# Patient Record
Sex: FEMALE | Race: WHITE | Employment: FULL TIME | URBAN - METROPOLITAN AREA
[De-identification: names, ages, dates, MRNs, and addresses within clinical notes are randomized per-mention and may not be internally consistent; named-entity substitution may affect disease eponyms.]

---

## 2017-01-20 NOTE — PROGRESS NOTES
Patient ID:   Jayy Gutiérrez  is a 27year old female         HPI:     Here for annual gyn exam. Last seen 2015. New medical/surgical hx:  None for her. Mother with partial thyroidectomy for nodule. Pathology pending.  Had TFT's with salma to Visit:  Levonorgestrel-Ethinyl Estrad 0.1-20 MG-MCG Oral Tab Take 1 tablet by mouth daily. Disp: 3 Package Rfl: 0     No current facility-administered medications on file prior to visit.   PHYSICAL EXAM:    Physical Exam   /76 mmHg  Pulse 75  Ht 64

## 2017-01-25 NOTE — PROGRESS NOTES
Quick Note:    ASCUS pap smear but HPV screening negative = negative pap smear.  Repeat pap smear in on year.  ______

## 2017-01-31 NOTE — TELEPHONE ENCOUNTER
Left detailed message on cell voicemail. Pt has FYI. Ascus pap/hpv neg- f/u 1 year rec. Pt to call if any questions or concerns.

## 2018-01-10 NOTE — PATIENT INSTRUCTIONS
Middle Ear Infection (Adult)  You have an infection of the middle ear, the space behind the eardrum. This is also called acute otitis media (AOM). Sometimes it is caused by the common cold.  This is because congestion can block the internal passage (eusta The sinuses are air-filled spaces within the bones of the face. They connect to the inside of the nose. Sinusitis is an inflammation of the tissue lining the sinus cavity. Sinus inflammation can occur during a cold.  It can also be due to allergies to polle · Do not use nasal rinses or irrigation during an acute sinus infection, unless told to by your health care provider. Rinsing may spread the infection to other sinuses.   · Use acetaminophen or ibuprofen to control pain, unless another pain medicine was pre

## 2018-01-10 NOTE — PROGRESS NOTES
HPI:   Darcie Collins is a 32year old female who presents for upper respiratory symptoms for several weeks.  Patient reports sore throat only at the beginning of sx's, congestion, dry cough, ear pain, sinus pain, OTC cold meds have not been helping, den 64.5\"   Wt 146 lb   LMP 12/24/2017 (Approximate)   BMI 24.67 kg/m²   GENERAL: well developed, well nourished,in no apparent distress  SKIN: no rashes,no suspicious lesions  EYES:PERRL, EOMI,conjunctiva are clear  HEENT: atraumatic, normocephalic,no pharyn

## 2018-01-11 NOTE — TELEPHONE ENCOUNTER
Pt's Mom informed of instructions per daughter's request to speak with Mom and voiced understanding.

## 2018-01-11 NOTE — TELEPHONE ENCOUNTER
I would continue with Augmentin at this time. Our choices are limited as far as antibiotic use for sinus infection especially given that she has an allergy to Levaquin.   Even though her headache started to get worse after starting Augmentin, her worsening

## 2018-01-11 NOTE — TELEPHONE ENCOUNTER
Per pt she was seen yesterday and was diagnosed with ear infection and sinus infection. Pt called and complained of severe headache on her forehead and radiates all over . States she started the abx last night and now with the severe headache.   Wants to

## 2018-01-16 NOTE — TELEPHONE ENCOUNTER
Patient last seen 1/20/17; due for annual. Please contact her to schedule appt and then return to RN pool for refill.  Thank you

## 2018-01-19 NOTE — TELEPHONE ENCOUNTER
Patient was seen by Dr. Miladis Toro on 1/10/18 for sinus/ear infection. Patient  States her sinuses are much better but her ear still has pain and pressure. States she has only 2 doses of Amoxicillin-Pot Clavulanate 875-125 MG Oral Tab left.   With the weekend c

## 2018-01-19 NOTE — TELEPHONE ENCOUNTER
Will send in trial of medrol dose sandi but if no improvement, go to immediate care over the weekend. Otherwise, I can see her Monday or Tuesday if no better and not severe.

## 2018-01-19 NOTE — TELEPHONE ENCOUNTER
Raya De Leon spoke with dr soni who provided the following info for us to advise pt-  He will send order for a medrol dose pack to pharmacy today.  If new or worsening sx over wkend, proceed to [facility] immediate care for evaluation/further recommendatio

## 2018-01-19 NOTE — TELEPHONE ENCOUNTER
Call to pt's cell reaches identified # voice mail. Per hipaa consent, left detailed vmm with dr soni's comments/instructions and rationale for medrol dosepak. Informed rx sent to [facility].   Instructed to have pharmacist show her and explain medrol do

## 2018-01-26 NOTE — TELEPHONE ENCOUNTER
Pt transferred to nurse. She reports finishing steroid yesterday for sinus inf/ear inf. Had been on Augmentin x 10 days prior, finished 1/20. Reports sinus feels better, states she feels great but is still having some lingering pressure in her rt ear.  Wh

## 2018-03-03 NOTE — PROGRESS NOTES
[facility]  Obstetrics and Gynecology  History & Physical    CC: Patient is here for annual well woman exam     Subjective:     HPI: Stella Rowland is a 32year old [place] female here for annual well women exam.  Patient reports no complaints 08/04/2009      OPV                   01/11/1988 06/05/1989 08/14/1990 05/23/1994      Rubella               08/10/1989      TD                    08/04/2003      PSH:  Past Surgical History:  10-30-14: COLPOSCOPY, CERVIX W/UPP mass, no skin or nipple changes and no axillary adenopathy  Abdominal exam:  soft, nontender, nondistended, +BS  Pelvic exam:   VULVA: normal appearing vulva with no masses, tenderness or lesions  VAGINA: normal appearing vagina with normal color and disch

## 2018-03-06 NOTE — PROGRESS NOTES
Patient notified. All results and colpo explained to patient. Patient verbalized understanding. Transferred to reception to schedule appt.

## 2018-03-06 NOTE — PROGRESS NOTES
Please inform the patient of abnormal pap smear noted for LSIL. Patient will need colposcopy. Please provide patient with information of pap smear abnormality and procedure details. Please schedule patient for colposcopy.

## 2018-04-09 PROBLEM — R87.612 PAPANICOLAOU SMEAR OF CERVIX WITH LOW GRADE SQUAMOUS INTRAEPITHELIAL LESION (LGSIL): Status: ACTIVE | Noted: 2018-04-09

## 2018-04-09 NOTE — PATIENT INSTRUCTIONS
[facility] Department of OB/GYN  After Care Instructions for Colposcopy/Biopsy      Biopsy Results   You will receive a phone call with your biopsy results in 7 business days. If you have not received your biopsy results in 7 days, please contact our office.   Bishop Zhou

## 2018-04-09 NOTE — PROCEDURES
Colposcopy Procedure Note    Date of Procedure: 04/09/18    Indications: 32year old y/o female with LGSIL who presents for colposcopy. Hx of LGSIL in 2014 with normal colposcopy but no cervical biopsy and normal subsequent repeat pap smear.      Pre-proced

## 2018-04-18 NOTE — PROGRESS NOTES
Patient messaged on 04/18/18 and results discussed via [employer] per patient request   F/u in 1 year for co-testing.

## 2018-05-30 NOTE — TELEPHONE ENCOUNTER
32year old patient complaining of vaginal itching, irritation, increased white, milky/cloudy discharge that started 5-6 days ago. Denies any odor to the discharge. No clumpy white discharge.   Last OV date: 4/9/18 with Dr. Marti Phillips for colposcopy  Recent

## 2018-05-30 NOTE — TELEPHONE ENCOUNTER
Contacted patient. Let her know that Dr. El Cross would like her to try medication to treat yeast at this time. Instructed on Diflucan and advised to call back with any new, worsening, or symptoms that persist after completing treatment.  Patient states under

## 2019-02-07 NOTE — TELEPHONE ENCOUNTER
Pt coming in early for annual and is to check with insurance.     Future Appointments   Date Time Provider Lukasz Hernadez   2/12/2019  3:00 PM Emma Helton MD [facility]

## 2019-02-12 NOTE — PATIENT INSTRUCTIONS
Please return in 2 months for your annual gyne visit or sooner if having abnormal vaginal bleeding or severe pelvic pain

## 2019-03-06 NOTE — TELEPHONE ENCOUNTER
Last OV: 02/12/19  Last refill date: 02/07/19  Follow-up: RTC 2 months for pap smear and annual  Next appt.: 05/18/19    Refill sent to pharmacy.

## 2019-05-18 PROBLEM — N87.0 CIN I (CERVICAL INTRAEPITHELIAL NEOPLASIA I): Status: ACTIVE | Noted: 2019-05-18

## 2019-05-18 NOTE — PROGRESS NOTES
[facility]  Obstetrics and Gynecology  History & Physical    CC: Patient is here for annual well woman exam     Subjective:     HPI: Giovanna Ellis is a 25year old [place] [facility] female here for annual well women exam.  Patient reports doing well.  Conchita Reeder 01/23/1990 08/20/1999      Measles               08/10/1989      Meningococcal-Menactra                          08/04/2009      OPV                   01/11/1988 06/05/1989 08/14/1990 05/23/1994      Rubella               08/1  Service: Not Asked        Blood Transfusions: Not Asked        Caffeine Concern: Yes          1-2 cups dalily        Occupational Exposure: Not Asked        Hobby Hazards: Not Asked        Sleep Concern: Not Asked        Stress Concern: Not Asked size, shape, consistency and nontender  ADNEXA: normal adnexa in size, nontender and no masses  Ext: non-tender, no edema    Assessment:     Pravin Carias is a 25year old  female here for annual well women exam.     Patient Active Problem List:

## 2019-05-24 NOTE — PROGRESS NOTES
Please inform of ASCUS but negative HPV  Therefore recommend to repeat pap smear with HPV testing in 3 years   Continue annual well woman exams

## 2019-06-29 NOTE — TELEPHONE ENCOUNTER
Patient states that she got a bill from pathology for her pap. Patient called insurance and they said it was billed as non preventative. Patient states she was here for her annual and pap on May 18th, 2019. Can we rebill the pap as preventative?

## 2020-07-15 NOTE — PROGRESS NOTES
[facility]  Obstetrics and Gynecology  History & Physical    CC: Patient presents for a well woman exam     Subjective:     HPI: Darcie Collins is a 30year old [place] female here for a well women exam. Patient reports doing well.  She would l 08/14/1990      MMR                   01/23/1990 08/20/1999      Measles               08/10/1989      Meningococcal-Menactra                          08/04/2009      OPV                   01/11/1988 06/05/1989 08/14/1990 file        Emotionally abused: Not on file        Physically abused: Not on file        Forced sexual activity: Not on file    Other Topics      Concerns:         Service: Not Asked        Blood Transfusions: Not Asked        Caffeine Concern: Yes or lesions  URETHRA: normal appearing  PERINEUM: no lesions  VAGINA: normal appearing vagina with normal color and discharge, no lesions  CERVIX: normal appearing cervix without discharge or lesions  UTERUS: uterus is normal size, shape, consistency and no

## 2021-07-02 NOTE — TELEPHONE ENCOUNTER
Last OV: 7/15/20 with Dr. Luis Fernando Montoya for annual  Last refill date: 7/15/20  Follow-up: 1 year  Next appt.: none scheduled; due 7/2021    Patient due for annual. Please contact her to schedule appt and then return to RN pool for refill.  Thank you

## 2021-08-02 NOTE — TELEPHONE ENCOUNTER
Patient is planning on starting family soon and wanted recommendations for COVID vaccine. Advised patient we do recommend our pregnant patients get the vaccine but she should speak with her PCP for further recommendations as well.

## 2021-08-02 NOTE — TELEPHONE ENCOUNTER
Patient called stating she has questions regarding covid vaccine . Regarding trying to conceive and effects of it?     Thank you

## 2021-08-19 NOTE — PROGRESS NOTES
[facility]  Obstetrics and Gynecology  History & Physical    CC: Patient presents for a well woman exam     Subjective:     HPI: Jazmin Carlson is a 26year old [place] female here for a well women exam. Patient reports doing well.  She would l (chronic)        Immunization History:     Immunization History  Administered            Date(s) Administered    DTP                   11/30/1988 02/20/1989 08/14/1990 05/23/1994      HEP B                 12/31/1998 02/08/199 Narrative      Not on file    Social Determinants of Health  Financial Resource Strain:       Difficulty of Paying Living Expenses:   Food Insecurity:       Worried About Running Out of Food in the Last Year:       Ran Out of Food in the Last Year:   Trans no tachypnea   Breast: symmetric, no dominant or suspicious mass, no skin or nipple changes and no axillary adenopathy  Abdominal exam: soft, nontender, nondistended  Pelvic exam:   VULVA: normal appearing vulva with no masses, tenderness or lesions  URETH

## 2021-08-27 NOTE — TELEPHONE ENCOUNTER
Pt last seen 8/19/21 for annual and expressed desire to continue on current OCP; refill was not given at that time. Refilled x one year.

## 2022-06-21 NOTE — TELEPHONE ENCOUNTER
Spoke with patient. Patient was wondering if it is normal to be seen until almost 10 weeks gestation and also why is she not feeling nauseous. At this time, she is only experiencing breast tenderness.  appointment; 7/26/2022   Patient denies any vaginal bleeding, pain, nausea, vomiting. Reassured patient that it is normal to be seen around 10 weeks gestation. Also that it is ok not to feel nauseous yet. ER precautions given. All questions answered. Advised patient to call back with any other questions or concerns. Pt  verbalizes understanding.

## 2022-06-21 NOTE — TELEPHONE ENCOUNTER
Patient has some questions for the nurse.     Future Appointments   Date Time Provider Lukasz Hernadez   7/26/2022  5:15 PM [facility]   7/26/2022  5:45 PM Tej Wilkinson MD [facility]

## 2022-07-26 NOTE — PROGRESS NOTES
[facility]  Obstetrics and Gynecology    Subjective:     Emerald Bay is a 33year old [address] [facility] [place] who presents with c/o secondary amenorrhea and positive home pregnancy test. The patient was recommended to return for further evaluation. The patient reports overall feeling well. Denies abdominal/pelvic pain, VB, abnormal discharge. Patient's last menstrual period was 2022 (exact date). . Pt reports regular menstrual cycles. She was actively trying to conceive. Review of Systems:  General:  denies fevers, chills, fatigue and malaise. Respiratory:  denies SOB, dyspnea, cough or wheezing  Cardiovascular:  denies chest pain, palpitations, exercise intolerance   GI: denies abdominal pain, diarrhea, constipation  :  denies dysuria, hematuria, increased urinary frequency. denies abnormal uterine bleeding or vaginal discharge. Objective:     General: AAOx3, NAD, appears well  Resp: unlabored breathing    Imaging:  Dating US 2022: Viable maravilla IUP with CRL 3.1cm c/w 10w0d. . No free fluid, adnexa appear normal.       Assessment:     Emerald Bay is a 33year old  with viable maravilla IUP at 9w6d by LMP c/w US today, final JEAN CARLOS 23. Patient Active Problem List:     Familial benign hematuria     HAI I (cervical intraepithelial neoplasia I)      Plan:     - positive urine pregnancy test and viable IUP on US  - Prenatal course and care discussed with patient - visits, office locations, providers and on-call physicians  - SAB/return precautions provided   - N/V recommendations if relevant  - Pt provided with education on safety, diet, exercise, caffiene, tobacco, ETOH, sexual activity, ER precautions, diet, exercise, appropriate otc medication use, substance abuse   - Counseled on taking a PNV with folic acid and DHA  - genetic screening testing d/w patient. She was given educational information and will offer screening at next visit.  Advised to call insurance company prior to her next visit. - cervical cancer screening: UTD with neg pap and co-testing in 2021    All of the findings and plan were discussed with the patient. She notes understanding and agrees with the plan of care. All questions were answered to the best of my ability at this time.     RTC for new OB visit or sooner if needed     Fredo Arenas MD  [facility]  7/26/2022 6:08 PM

## 2022-07-28 PROBLEM — R87.612 PAPANICOLAOU SMEAR OF CERVIX WITH LOW GRADE SQUAMOUS INTRAEPITHELIAL LESION (LGSIL): Status: RESOLVED | Noted: 2018-04-09 | Resolved: 2022-07-28

## 2022-08-22 NOTE — TELEPHONE ENCOUNTER
Pt has new ob appt with Dr. Jenkins Favorite on 8/29 and she called her insurance and she has question/ need code for some test that she is going to need prior to her visit.  Please call pt back

## 2022-08-29 PROBLEM — Z34.90 ENCOUNTER FOR SUPERVISION OF NORMAL PREGNANCY, ANTEPARTUM (HCC): Status: ACTIVE | Noted: 2022-08-29

## 2022-08-29 PROBLEM — N89.8 HYMENAL REMNANT: Status: ACTIVE | Noted: 2022-08-29

## 2022-08-29 PROBLEM — N87.0 CIN I (CERVICAL INTRAEPITHELIAL NEOPLASIA I): Status: RESOLVED | Noted: 2019-05-18 | Resolved: 2022-08-29

## 2022-09-12 PROBLEM — O26.899 RH NEGATIVE STATE IN ANTEPARTUM PERIOD (HCC): Status: ACTIVE | Noted: 2022-09-12

## 2022-09-12 PROBLEM — Z67.91 RH NEGATIVE STATE IN ANTEPARTUM PERIOD (HCC): Status: ACTIVE | Noted: 2022-09-12

## 2022-09-12 NOTE — TELEPHONE ENCOUNTER
Received genetic results in [address] [place] [address] on Dr.Bannons gibson for review

## 2022-09-12 NOTE — TELEPHONE ENCOUNTER
PT calling and has questions on her Lab results    Advised they need to be re-done    Pt wants to speak to a nurse also

## 2022-09-12 NOTE — TELEPHONE ENCOUNTER
Reviewed results  INCORRECT JEAN CARLOS was used for QUAD   Therefore, results of QUAD not accurate  I called [facility] and had them update JEAN CARLOS to 2/22/23  They will recalculate and results available in 1-2 days. Also noted pt is RH negative. I called patient, she did not answer. Left a VM telling her to disregard QUAD results  Also a [employer] message was sent regard QUAD and rh negative.     Portia Zhang MD, 09/12/22, 4:32 PM

## 2022-09-20 NOTE — PROGRESS NOTES
Patient presents with:  Prenatal Care: martinez - wants to discuss genetic testing labs that were done. Routine prenatal visit without complaints. Patient denies any bleeding, leaking fluid, cramping, or contractions. Assessment/Plan:  17w6d doing well  Anatomy US 2 weeks  Glycosuria- no risk factors for pre-gestational diabetes. Recheck at next visit and consider early glucola if still positive. Rh negative- Rhogam 28 weeks, Ab screen 24 weeks. Patient had Quad screen, recalculated due to incorrect EDC. Patient reassured regarding test results. Reviewed signs and symptoms of  labor  Diagnoses and all orders for this visit:    Supervision of normal first pregnancy, antepartum  -     URINALYSIS NONAUTO W/O SCOPE (OB URISTIX)       Return for Ultrasound 2 weeks, MARTINEZ Hand

## 2022-10-07 PROBLEM — Z34.00 SUPERVISION OF NORMAL FIRST PREGNANCY, ANTEPARTUM (HCC): Status: ACTIVE | Noted: 2022-08-29

## 2022-10-07 NOTE — PROGRESS NOTES
[facility]    GA: 20w2d   10/07/22  0955   BP: 108/72   Pulse: 75   Weight: 149 lb 8 oz (67.8 kg)   Height: 65\"       Doing well  No complaints. Denies LOF/VB/uctx  RH neg, repeat AB screen ordered   Genetic testing Quad screen    Anatomy Scan 10/07 unremarkable   CBC and 1 hr GTT order and instructions provided    Problem List Items Addressed This Visit        Obstetrical    Supervision of normal first pregnancy, antepartum - Primary    Relevant Orders    US OB COMPLETE 2ND TRIMESTER >14 WKS [facility] ONLY [address] (Completed)    CBC WITH DIFFERENTIAL WITH PLATELET    GLUCOSE 1HR OB    Rh negative state in antepartum period    Relevant Orders    ANTIBODY SCREEN, RBC W/REFL ID, TITER AND AG    ABORH (BLOOD TYPE)       Genitourinary    Hymenal remnant      Other Visit Diagnoses     Prenatal care, antepartum        Relevant Orders    URINALYSIS NONAUTO W/O SCOPE (OB URISTIX) (Completed)            RTC in 4 wks         Note to patient and family   The [facility] makes medical notes available to patients in the interest of transparency. However, please be advised that this is a medical document. It is intended as jpbi-yr-shwa communication. It is written and medical language may contain abbreviations or verbiage that are technical and unfamiliar. It may appear blunt or direct. Medical documents are intended to carry relevant information, facts as evident, and the clinical opinion of the practitioner.

## 2022-10-18 PROBLEM — O98.519 COVID-19 AFFECTING PREGNANCY, ANTEPARTUM (HCC): Status: ACTIVE | Noted: 2022-10-16

## 2022-10-18 PROBLEM — U07.1 COVID-19 AFFECTING PREGNANCY, ANTEPARTUM (HCC): Status: ACTIVE | Noted: 2022-10-16

## 2022-11-02 NOTE — PROGRESS NOTES
Patient presents with:  Prenatal Care    Routine prenatal visit. Patient without complaints. Good fetal movement  Patient denies any bleeding, leaking fluid, cramping, or contractions. Assessment/Plan:  24w0d doing well  1 hour GTT, CBC previously ordered - reminded patient to complete  Depression screen reviewed. Blood type A negative - rhogam at 28 weeks  Reviewed vaccine recommendations: Tdap discussed, patient will consider at next visit  COVID in pregnancy- 32 week growth US  Fetal movement and  labor discussed    Diagnoses and all orders for this visit:    24 weeks gestation of pregnancy  -     URINALYSIS NONAUTO W/O SCOPE (OB URISTIX)    COVID-19 affecting pregnancy, antepartum    Rh negative state in antepartum period      Return in about 4 weeks (around 2022) for [facility].

## 2022-12-01 NOTE — PROGRESS NOTES
Patient presents with:  Prenatal Care: [facility]    Routine prenatal visit. Patient without complaints. Good fetal movement  Patient denies any bleeding, leaking fluid, cramping, or contractions. Assessment/Plan:  28w1d doing well  1 hour GTT, CBC done. HIV [facility] ordered. Hx of Covid- growth US 32 weeks. Blood type A negative. Rhogam today  Reviewed vaccine recommendations: Tdap next visit. Kick count information given. Reviewed  labor signs and symptoms. Diagnoses and all orders for this visit:    28 weeks gestation of pregnancy  -     URINALYSIS NONAUTO W/O SCOPE (OB URISTIX)     screening encounter  -     HIV AG AB COMBO    Rh negative state in antepartum period  -     Rho D immune globulin (RhoGAM) IM injection 300 mcg      Return in about 2 weeks (around 12/15/2022) for Routine Prenatal Visit, HIV next available, growth US 32 weeks. Roe Brambila

## 2022-12-12 NOTE — PROGRESS NOTES
Patient presents with:  Prenatal Care    Routine prenatal visit without complaints. Patient denies any bleeding, leaking fluid, cramping, or regular uterine contractions. Good fetal movement. Assessment/Plan:  29w5d doing well  HIV ordered [facility]  Hx of Covid- growth US 32 wks  Kick counts reminded  Reviewed  labor signs and symptoms. Reviewed vaccine recommendations: Tdap today  Diagnoses and all orders for this visit:    Prenatal care, antepartum  -     URINALYSIS NONAUTO W/O SCOPE (OB URISTIX)    Need for vaccination  -     TETANUS, DIPHTHERIA TOXOIDS AND ACELLULAR PERTUSIS VACCINE (TDAP), >7 YEARS, IM USE       Return in about 2 weeks (around 2022) for Routine Prenatal Visit, HIV next available.

## 2022-12-28 PROBLEM — O99.713 PRURITUS OF PREGNANCY IN THIRD TRIMESTER (HCC): Status: ACTIVE | Noted: 2022-12-28

## 2022-12-28 PROBLEM — L29.9 PRURITUS OF PREGNANCY IN THIRD TRIMESTER (HCC): Status: ACTIVE | Noted: 2022-12-28

## 2022-12-28 NOTE — PROGRESS NOTES
Patient presents with:  Prenatal Care: [facility] after U/S    Routine prenatal visit complaining of itching upper abdomen with faint rash  Patient denies any bleeding, leaking fluid, cramping, or regular uterine contractions. Good fetal movement. PE: papular/reticular rash upper abdomen  Ultrasound- 38% EFW. Nl amniotic fluid volume. vertex  Assessment/Plan:  32w0d doing well  Pruritis of pregnancy- likely PUPPS, but will check bile acids. Still needs HIV [facility].  Kick counts reminded  Reviewed  labor signs and symptoms. Reviewed vaccine recommendations: Tdap done. Diagnoses and all orders for this visit:    32 weeks gestation of pregnancy  -     URINALYSIS NONAUTO W/O SCOPE (OB URISTIX)    COVID-19 affecting pregnancy, antepartum  -     US OB FOLLOW UP SPECIFIC CONDITION PER FETUS [facility] ONLY; Future    Pruritus of pregnancy in third trimester  -     BILE ACIDS, FRACTIONATED AND TOTAL, PREGNANCY       Return in about 2 weeks (around 2023) for Routine Prenatal Visit, Labs Next Available.

## 2022-12-28 NOTE — PATIENT INSTRUCTIONS
LABOR & DELIVERY PRE-REGISTRATION    Thank you for choosing [facility] for you labor and delivery needs. We look forward to caring for you and your family in this exciting time. In order to better care for all of our patients, [facility] recommends that you complete your delivery registration as early in your pregnancy as possible. Registering for your delivery in advance saves you the time of doing so on your day of delivery and allows your care team to be better prepared for your delivery date. For more information about Labor and Delivery at [facility] and to pre-register, visit [address]--> Search Our Services-->Pregnancy & Baby. TWO WAYS TO REGISTER ONLINE    Epic MyChart allows access to multiple medical organizations, including [employer] and other medical organizations that use the [employer]ainment system. To add [employer] or any other medical organization, just tap My Organizations at the top left corner of the login page in the [address] shanna, and then click Add Organization. [facility] [employer] in [address] [place] [address] for your hospital stay through your CLARED account. After logging into CLARED, click on Visits. Under Visits, click on [employer] for Delivery and follow the directions to register. You may print the confirmation page. If you do not already have a CLARED account, ask our office for an Access Code. Go to [email or web address] and follow the prompts to set up your account. [employer] on [email or web address]- Pre-register for your hospital stay through the convenient online form on our website. Go to [email or web address]. Scroll down the page and click on [address] out all of the required information and click submit. You will receive a confirmation of your submission. Questions about registering for your hospital stay? Contact the [facility] Delivery at [phone].        FETAL MOVEMENT CHART    Although not all women need to perform daily fetal movement counts, if you notice a decrease in fetal activity, you should contact our office immediately. Begin counting fetal movements at 32 weeks of pregnancy. You may find that your baby is more active after eating or drinking. We want you to time how long it takes to feel 10 movements (kicks, flutters, swishes or rolls). You should feel at least 10 movements in 2 hours. You will likely feel 10 movements in less than 2 hours. If you have concerns, you can use the attached table to record movements. Record the time you feel the first movement and the tenth movement. This will help you observe patterns and discover how long it normally takes your baby to move 10 times. Please call us if any of the following occurs: You have not felt the baby move for a couple of hours  It is taking longer each day to get the tenth movement    The main point is we want you to know the characteristics of your baby, so you can tell the doctor or nurse if something different is happening. Again, if you notice a decrease in fetal movement, please give the office a call.     If our office is closed, the answering service will page the doctor on-call.    ------------------------------      Time M T W Th F S S                                                                                                                 Time M T W Th F S S                                                                                                           Time M T W Th F S S                                                                                                           Time M T W Th F S S

## 2023-01-12 NOTE — PROGRESS NOTES
MAITE    GA: 34w1d   23  0904   BP: 112/70   Pulse: 81   Weight: 160 lb 3.2 oz (72.7 kg)       Doing well, +FM  Denies LOF/VB/uctx  Rh negative, TDAP received, EPDS 0  PTL and Fetal movement instructions given      Problem List Items Addressed This Visit        Gravid and     Supervision of normal first pregnancy, antepartum    Rh negative state in antepartum period       Infectious Diseases    COVID-19 affecting pregnancy, antepartum       Skin    Pruritus of pregnancy in third trimester   Other Visit Diagnoses     Prenatal care, antepartum    -  Primary    Relevant Orders    URINALYSIS NONAUTO W/O SCOPE (OB URISTIX) (Completed)          RTC in 2 wks      Note to patient and family   The [facility] makes medical notes available to patients in the interest of transparency. However, please be advised that this is a medical document. It is intended as qkgt-gi-gohm communication. It is written and medical language may contain abbreviations or verbiage that are technical and unfamiliar. It may appear blunt or direct. Medical documents are intended to carry relevant information, facts as evident, and the clinical opinion of the practitioner.

## 2023-01-20 NOTE — TELEPHONE ENCOUNTER
Pt calling states she started with aguilar matamoros contractions on Wednesday but states lingering into today.

## 2023-01-20 NOTE — TELEPHONE ENCOUNTER
35w2d. Experiencing contractions. Feel like mild period cramping. Woke up with them on Wed. And Thursday and Friday throughout the day. Feels like has increase pressure in pelvis. There is no pattern to the cramping. Denies bleeding. Patient to continue to monitor over the weekend. Advised to rest and hydrate to help avoid the contractions. To call back if cramping gets worse or becomes more consistent. Understanding verbalized.

## 2023-01-27 NOTE — PROGRESS NOTES
MAITE    GA: 36w2d   23  1156   BP: 110/62   Weight: 165 lb 3.2 oz (74.9 kg)       Doing well, +FM  Denies LOF/VB/uctx  Mode of delivery:   anticipated  SVE 0/50/-2   GBS collected   Fetal movement count given  Labor precautions provided   Hemorrhoid surgery, OTC remedies and precautions provided. Problem List Items Addressed This Visit        Genitourinary and Reproductive    Hymenal remnant       Gravid and     Supervision of normal first pregnancy, antepartum - Primary    Relevant Orders    STREP B CULTURE    Rh negative state in antepartum period       Infectious Diseases    COVID-19 affecting pregnancy, antepartum       Skin    Pruritus of pregnancy in third trimester   Other Visit Diagnoses     Prenatal care, antepartum        Relevant Orders    URINALYSIS NONAUTO W/O SCOPE (OB URISTIX) (Completed)            RTC 1 week          Note to patient and family   The [facility] makes medical notes available to patients in the interest of transparency. However, please be advised that this is a medical document. It is intended as ipne-qx-rhbf communication. It is written and medical language may contain abbreviations or verbiage that are technical and unfamiliar. It may appear blunt or direct. Medical documents are intended to carry relevant information, facts as evident, and the clinical opinion of the practitioner.

## 2023-02-04 NOTE — PROGRESS NOTES
MAITE 37w3d    Doing well, +FM  Denies contractions  Denies LOF, VB      1. FHT-P  2. PNL:  GBS negative  3. Mode of delivery: anticipate    4. Immunizations: s/p TDAP  5. Labor precautions reviewed  6.  Fetal expectations discussed    Return in 1 weeks ,DirectAddress_Unknown

## 2023-02-10 NOTE — PROGRESS NOTES
MAITE 38w2d    Doing ok, +FM  No true contractions but maybe presure, feels tight  no LOF, VB      1. FHT-present  2. PNL:  GBS neg  3. Mode of delivery:  anticipated    4. Immunizations: s/p TDAP  5. RH neg: reviewed rhogam workup post delivery  6.  Reviewed potential options in regards to bathing the baby, vaccines, colace stool softener, pelvic floor PT    Return in 1 weeks
16

## 2023-02-11 PROBLEM — Z34.90 PREGNANCY (HCC): Status: ACTIVE | Noted: 2023-02-11

## 2023-02-11 NOTE — PROGRESS NOTES
Patient up to bathroom with assist x 2. Voided 800cc. Patient transferred to mother/baby room 2210 per wheelchair in stable condition with baby and personal belongings. Accompanied by significant other and staff. Report given to mother/baby RN.

## 2023-02-11 NOTE — L&D DELIVERY NOTE
Tarik Myles, Girl [[id number]]    Labor Events     labor?: No   steroids?: None  Antibiotics received during labor?: No  Antibiotics (enter # doses in comment): none  Rupture date/time: 2023 1015     Rupture type: SROM  Fluid color: Clear  Induction: None  Augmentation: None  Intrapartum & labor complications: None     Labor Event Times    Labor onset date/time: 2023 0730  Dilation complete date/time: 2023 1600  Start pushing date/time: 2023 1409      Presentation    Presentation: Vertex  Position: Right Occiput Anterior     Operative Delivery    Operative Vaginal Delivery: No            Shoulder Dystocia    Shoulder Dystocia: No     Anesthesia    Method: Epidural           Delivery    Head delivery date/time: 2023 15:39:00   Delivery date/time:  23 15:40:00   Delivery type: Normal spontaneous vaginal delivery    Details:     Delivery location: delivery room     Delivery Providers    Delivering Clinician: Monico Boxer, DO   Delivery personnel:  Provider Role   Erika Galarza, RN Baby Nurse   Galileo Cook RN Delivery Nurse         Cord    Vessels: 3 Vessels  Complications: None  Timed cord clamping: Yes  Time in sec: 30  Cord blood disposition: to lab  Gases sent?: No     Resuscitation    Method: None     [place] Measurements    No data filed     Placenta    Date/time: 2023 1542  Removal: Spontaneous  Appearance: Intact  Disposition: held for future pathology     Apgars    Living status: Living   Apgar Scoring Key:    0 1 2    Skin color Blue or pale Acrocyanotic Completely pink    Heart rate Absent <100 bpm >100 bpm    Reflex irritability No response Grimace Cry or active withdrawal    Muscle tone Limp Some flexion Active motion    Respiratory effort Absent Weak cry; hypoventilation Good, crying              1 Minute:  5 Minute:  10 Minute:  15 Minute:  20 Minute:    Skin color:  1       Heart rate:  2       Reflex irritablity:  2 Muscle tone:  2       Respiratory effort:  2       Total:  9          Apgars assigned by: José Luis Richter RN   disposition: with mother     Skin to Skin    Skin to skin initiated date/time: 2023 1540  Skin to skin with: Mother     Vaginal Count    Initial count RN: Nathaniel Acuna RN  Initial count Tech: Genetta Motts   Sponges   Sharps    Initial counts 11   0    Final counts 11   2    Final count RN: Nathaniel Acuna RN  Final count MD: Miguel Escobar, DO     Delivery (Maternal)    Episiotomy: None  Perineal lacerations: 2nd Repaired?: Yes   Labial laceration: bilateral Repaired?: Yes   Vaginal laceration?: No    Cervical laceration?: No    Clitoral laceration?: No             at 38w3d with  female infant, APGARS 9/9, weight 6lb13 oz. Body and shoulders easily delivered. Cord clamped x2 and cut after 30 seconds. 2bd degree perineal laceration and b/l labial lacerations repaired with 2-0 and 3-0  chromic. Good hemostasis. Placenta delivered spontaneously, intact.

## 2023-02-11 NOTE — ANESTHESIA PROCEDURE NOTES
Labor Analgesia    Date/Time: 2/11/2023 12:42 PM  Performed by: Blade Bauer MD  Authorized by: Blade Bauer MD       General Information and Staff    Start Time:  2/11/2023 12:42 PM  End Time:  2/11/2023 12:52 PM  Anesthesiologist:  Blade Bauer MD  Performed by:   Anesthesiologist  Patient Location:  OB  Site Identification: surface landmarks    Reason for Block: labor epidural    Preanesthetic Checklist: patient identified, IV checked, risks and benefits discussed, monitors and equipment checked, pre-op evaluation, timeout performed, IV bolus, anesthesia consent and sterile technique used      Procedure Details    Patient Position:  Sitting  Prep: ChloraPrep    Monitoring:  Heart rate and continuous pulse ox  Approach:  Midline    Epidural Needle    Injection Technique:  MELISSA air  Needle Type:  Tuohy  Needle Gauge:  17 G  Needle Length:  3.375 in  Needle Insertion Depth:  4  Location:  L3-4    Spinal Needle      Catheter    Catheter Type:  End hole  Catheter Size:  19 G  Catheter at Skin Depth:  10  Test Dose:  Negative    Assessment    Sensory Level:  T8    Additional Comments

## 2023-02-11 NOTE — PLAN OF CARE
Problem: Patient/Family Goals  Goal: Patient/Family Long Term Goal  Description: Patient's Long Term Goal: uncomplicated vaginal delivery    Interventions:  - See additional Care Plan goals for specific interventions  Outcome: Progressing  Goal: Patient/Family Short Term Goal  Description: Patient's Short Term Goal: effective pain management    Interventions:   - See additional Care Plan goals for specific interventions  Outcome: Progressing     Problem: BIRTH - VAGINAL/ SECTION  Goal: Fetal and maternal status remain reassuring during the birth process  Description: INTERVENTIONS:  - Monitor vital signs  - Monitor fetal heart rate  - Monitor uterine activity  - Monitor labor progression (vaginal delivery)  - DVT prophylaxis (C/S delivery)  - Surgical antibiotic prophylaxis (C/S delivery)  Outcome: Progressing     Problem: PAIN - ADULT  Goal: Verbalizes/displays adequate comfort level or patient's stated pain goal  Description: INTERVENTIONS:  - Encourage pt to monitor pain and request assistance  - Assess pain using appropriate pain scale  - Administer analgesics based on type and severity of pain and evaluate response  - Implement non-pharmacological measures as appropriate and evaluate response  - Consider cultural and social influences on pain and pain management  - Manage/alleviate anxiety  - Utilize distraction and/or relaxation techniques  - Monitor for opioid side effects  - Notify MD/LIP if interventions unsuccessful or patient reports new pain  - Anticipate increased pain with activity and pre-medicate as appropriate  Outcome: Progressing     Problem: ANXIETY  Goal: Will report anxiety at manageable levels  Description: INTERVENTIONS:  - Administer medication as ordered  - Teach and rehearse alternative coping skills  - Provide emotional support with 1:1 interaction with staff  Outcome: Progressing

## 2023-02-11 NOTE — PLAN OF CARE
Problem: Patient/Family Goals  Goal: Patient/Family Long Term Goal  Description: Patient's Long Term Goal: uncomplicated vaginal delivery    Interventions:  - See additional Care Plan goals for specific interventions  Outcome: Completed  Goal: Patient/Family Short Term Goal  Description: Patient's Short Term Goal: effective pain management    Interventions:   - See additional Care Plan goals for specific interventions  Outcome: Completed

## 2023-02-12 NOTE — PROGRESS NOTES
Received in mother baby in stable condition. Oriented to room. Bed in low position. Call light in reach. Side rails up x2.

## 2023-02-13 NOTE — PLAN OF CARE
Rooming in with baby for now, working on breastfeeding    Problem: POSTPARTUM  Goal: Long Term Goal:Experiences normal postpartum course  Description: INTERVENTIONS:  - Assess and monitor vital signs and lab values. - Assess fundus and lochia. - Provide ice/sitz baths for perineum discomfort. - Monitor healing of incision/episiotomy/laceration, and assess for signs and symptoms of infection and hematoma. - Assess bladder function and monitor for bladder distention.  - Provide/instruct/assist with pericare as needed. - Provide VTE prophylaxis as needed. - Monitor bowel function.  - Encourage ambulation and provide assistance as needed. - Assess and monitor emotional status and provide social service/psych resources as needed. - Utilize standard precautions and use personal protective equipment as indicated. Ensure aseptic care of all intravenous lines and invasive tubes/drains.  - Obtain immunization and exposure to communicable diseases history.   Outcome: Progressing

## 2023-02-23 NOTE — TELEPHONE ENCOUNTER
Received paperwork for patient requesting medical records. Printed episode and faxed to number on form.

## 2023-03-31 PROBLEM — U07.1 COVID-19 AFFECTING PREGNANCY, ANTEPARTUM (HCC): Status: RESOLVED | Noted: 2022-10-16 | Resolved: 2023-03-31

## 2023-03-31 PROBLEM — O26.899 RH NEGATIVE STATE IN ANTEPARTUM PERIOD (HCC): Status: RESOLVED | Noted: 2022-09-12 | Resolved: 2023-03-31

## 2023-03-31 PROBLEM — O99.713 PRURITUS OF PREGNANCY IN THIRD TRIMESTER (HCC): Status: RESOLVED | Noted: 2022-12-28 | Resolved: 2023-03-31

## 2023-03-31 PROBLEM — Z34.00 SUPERVISION OF NORMAL FIRST PREGNANCY, ANTEPARTUM (HCC): Status: RESOLVED | Noted: 2022-08-29 | Resolved: 2023-03-31

## 2023-03-31 PROBLEM — Z34.90 PREGNANCY (HCC): Status: RESOLVED | Noted: 2023-02-11 | Resolved: 2023-03-31

## 2023-03-31 PROBLEM — O98.519 COVID-19 AFFECTING PREGNANCY, ANTEPARTUM (HCC): Status: RESOLVED | Noted: 2022-10-16 | Resolved: 2023-03-31

## 2023-03-31 PROBLEM — L29.9 PRURITUS OF PREGNANCY IN THIRD TRIMESTER (HCC): Status: RESOLVED | Noted: 2022-12-28 | Resolved: 2023-03-31

## 2023-03-31 PROBLEM — Z67.91 RH NEGATIVE STATE IN ANTEPARTUM PERIOD (HCC): Status: RESOLVED | Noted: 2022-09-12 | Resolved: 2023-03-31

## 2023-04-14 NOTE — PROGRESS NOTES
MUSCULOSKELETAL AND PELVIC FLOOR EVALUATION:     Diagnosis:    (normal spontaneous vaginal delivery) (O80)        Referring Provider: Teddy Bustos  Date of Evaluation:    2023    Precautions:  None Next MD visit:   none scheduled  Date of Surgery: n/a     PATIENT SUMMARY   Dali Fox is a 27year old female who presents to therapy today with complaints of pelvic floor dysfunction after giving birth. Reports that she had a vaginal delivery ~9 weeks ago. She reports that labor and delivery went quickly and she did sustain 2nd degree tears. Since giving birth, she feels that she has been healing well, but she does note slight pain with intercourse. Reports mild constipation and she does have hemmorhoids, which worsened during pregnancy. The external hemorrhoids are still present, but they are not painful. Reports 4-5 on BSS. She did have urinary leakage immediately after delivery, but it has since resolved. She does have mild urgency. She urinates frequently due to drinking a lot of water. She drinks  oz per day; she is breastfeeding. Current symptoms include: urgency/frequency and constipation    Pt describes pain level: current 0/10, best 0/10, worst 3/10. Quality: intermittent    Pregnant Now: No  Obstetrical/Gynecological history: : 1  Para: 1  Delivery method: vaginal  Occupation/Activities: [email or web address] ()   PFDI-20: 82.30/300; Impairment= 27.4 %    Sanjay Meng describes prior level of function as having no PFM dysfunction prior to pregnancy/childbirth. Pt goals include to strengthen her pelvic floor. Past medical history was reviewed with Sanjay Meng.  Significant findings include  has a past medical history of Acute bronchitis, Acute pharyngitis, Candidiasis of mouth, Excessive or frequent menstruation, Headache(784.0), Pap smear abnormality of cervix with LGSIL (10-17-14), Pap smear for cervical cancer screening (11, 13, 10-18-13), Pap smear for cervical cancer screening (10/29/2015), Papanicolaou smear of cervix with low grade squamous intraepithelial lesion (LGSIL) (4/9/2018), Unspecified otitis media, and Unspecified sinusitis (chronic). URINARY HABITS  Types of symptoms: other: frequent urination  Events associated with the onset of urinary complaints: pregnancy/childbirth  Abdominal/Vaginal Pressure complaints: no  Urinary Frequency: 1x every 2 hours   Urine Stream: WNL  Amount: WNL  Nocturia: 1-2x  Fluid Intake:   oz  Bladder irritants: NA  Urine Stop test: Able  Post void dribble: No  Hovering: No  Empty bladder just in case: Sometimes  Do you ever leak urine without knowing it? No    BOWEL HABITS  Types of symptoms: Constipation   Frequency of bowel movements: 1x/day  Stool consistency: [place] Stool Scale: 4-5  Do you strain with defecation: Yes, sometimes  Laxative use: No    SEXUAL HEALTH STATUS  Marinoff Scale: 1  History of Sexual Abuse: NA  Sexual [place] Status: Active  Pain with initial and/or deep penetration: initial  Pain with pelvic exam/tampon use: Yes, very mild    ASSESSMENT  Steven Mathis presents to physical therapy evaluation with primary c/o PFM dysfunction after childbirth. The results of the objective tests and measures show impaired abdominal tone and decreased TvA recruitment, suboptimal PFM strength and endurance with notable extrapelvic compensation, and impaired cough reflex. Functional deficits include but are not limited to compromised QoL and ability to participate in childcare tasks. Signs and symptoms are consistent with diagnosis of post-partum PFM dysfunction. Pt and PT discussed evaluation findings, pathology, POC and HEP. Pt voiced understanding and performs HEP correctly without reported pain. Skilled Pelvic Physical Therapy is medically necessary to address the above impairments and reach functional goals.     OBJECTIVE:   Posture: Mild forward head/rounded shoulders  Pelvic Alignment: WNL  Deep Tendon Reflexes: NT  Gait: pt ambulates on level ground with normal mechanics. External Palpation: Unremarkable  Scars (location/surgery): NA  Abdominal Wall Integrity: Mildly decreased tone  Diastasis Recti: <1 finger width    Range Of Motion  Lumbar AROM screen: WNL  LE AROM screen: grossly WNL     Strength (MMT) 5/5 RAJAT LE except 3+/5 hip abductors/extensors  Transverse Abdominis: 3/5    Flexibility Summary: WNL RAJAT LE    Special Tests  NT today    Informed consent for internal pelvic evaluation given: Yes    External Observation:   Voluntary contraction: present   Voluntary relaxation: present  Involuntary contraction: present  Involuntary relaxation: present    Mons pubis: WNL  Labia majora: WNL  Labia minora: WNL  Urethral meatus: WNL  Introitus: WNL  Perineal body: WNL    Sensory/Reflex:  Vestibule: normal bilaterally    Internal Examination   Scar: appear to be healing well, no significant TTP    Pelvic Floor Muscle strength: (PERF= Power/Endurance/Reps/Fast) MMT: 2+/3/4/6 -- impaired endurance, power, coordination   External Anal Sphincter: NT  Accessory Muscle Use: gluteals, adductors    Tissue Laxity Test:  Anterior Wall: WNL  Posterior Wall: WNL  Apical: WNL    Eccentric lengthening contraction: WNL  Bearing down Valsalva maneuver (2-3x): WNL    Internal Palpation: WNL except Pubococcygeus/Pubovaginalis R>Lminimal restriction and tenderness  Iliococcygeus R>L minimal restriction and tenderness     Cough reflex: impaired    Today's Treatment and Response:   Patient education was provided on objective findings of external and internal evaluation and expectations with treatment outcomes.  Educated on pelvic anatomy and function with diagrams and pelvic model, bladder normatives, adequate hydration levels, stress/urge urinary incontinence strategies and coordination of diaphragmatic breathing and pelvic floor contraction     Neuro:  Extensive education provided on pelvic floor and pelvic organ anatomy and function, including changes that occur during pregnancy, childbirth, and postpartum. Discussed importance of PFM strength and coordination training to restore optimal structural stability for pelvic organs. Discussed elevators, quick flicks, endurance contractions, and pelvic brace as part of HEP and provided 2 handouts. 25 mins    Manual:   Internal assessment performed and cues provided on proper PFM contraction, relaxation, and bearing down. Cued in proper endurance and fast-twitch contractions, as well as elevator contractions, and taught pelvic brace. 15 mins          Charges: PT Eval Low Complexity, neuro x 2, manual x 1      Total Timed Treatment: 40 min     Total Treatment Time: 55 min     Based on clinical rationale and outcome measures, this evaluation involved Low Complexity decision making. PLAN OF CARE:    Goals: (to be met in 10 visits)  Patient will improve PERF score to at least 3+/8/8//10 for optimal pelvic floor function and pelvic organ support. Patient will report consistent grade 4 on BSS for improved bowel emptying without straining. Patient will demonstrate ability to sustain a PFM contraction while lifting a 10lb object from the ground for safe return to household and occupational lifting. Patient will report use of KNACK contraction at least 75% of the time to re-establish cough reflex and mitigate urinary leakage with increases in intra-abdominal pressure. Patient will report adherence to HEP for continued exercise benefits  following cessation of PT. Frequency / Duration: Patient will be seen for 1-2 x/week or a total of 10 visits over a 90 day period.   Treatment will include: Manual Therapy, Neuromuscular Re-education, Therapeutic Activities, Therapeutic Exercise and Modalities to include: biofeedback     Education or treatment limitation: None  Rehab Potential:good      Patient/Family/Caregiver was advised of these findings, precautions, and treatment options and has agreed to actively participate in planning and for this course of care. Thank you for your referral. Please co-sign or sign and return this letter via fax as soon as possible to [phone]. If you have any questions, please contact me at Dept: [phone]    Sincerely,  Electronically signed by therapist: Trish Ayala, PT  [de-identified] certification required: Yes  I certify the need for these services furnished under this plan of treatment and while under my care.     X___________________________________________________ Date____________________    Certification From: 0/56/5312  To:7/13/2023

## 2023-04-21 NOTE — PROGRESS NOTES
Diagnosis:    (normal spontaneous vaginal delivery) (O80)    Referring Provider: Mary Gay  Date of Evaluation:    23    Precautions:  None Next MD visit:   none scheduled  Date of Surgery: n/a   Insurance Primary/Secondary: [employer] / N/A     # Auth Visits: 10           Subjective: Reports that she was able to do her exercise a few times this past week. She has been noticing a \"catching\" feeling in her L leg adductor tendon, especially when she abducts/externally rotates her hip. Pain: 0/10      Objective:   Fair TvA activation bilaterally, improvement with verbal/tactile cues  Good breath regulation with coordinated PFM contraction   ASLR: slight APT with SLR on L>R      Assessment: Progressed to PFM/TvA activation with concomitant hip exercises today to improve recruitment patterns of inner core muscles, as well as hip strength. Giancarlo Cox tolerated all exercises well with minimal cues required. She does demonstrate difficulty coordinating inhale/relax & exhale/contract; however, she was able to identify when her breathing was out of sync with her contractions and correct independently. Opted to have pt continue isolated PFM exercises as HEP for one more week and plan to update HEP next week. Goals: (to be met in 10 visits)  Patient will improve PERF score to at least 3+/8/8//10 for optimal pelvic floor function and pelvic organ support. Patient will report consistent grade 4 on BSS for improved bowel emptying without straining. Patient will demonstrate ability to sustain a PFM contraction while lifting a 10lb object from the ground for safe return to household and occupational lifting. Patient will report use of KNACK contraction at least 75% of the time to re-establish cough reflex and mitigate urinary leakage with increases in intra-abdominal pressure. Patient will report adherence to HEP for continued exercise benefits  following cessation of PT.      Plan: Continue with PFM/TvA co-contraction training. Continue to progress supine, SL, and seated exercise. Update HEP. Date: 4/21/2023  TX#: 2/10 Date:                 TX#: 3/ Date:                 TX#: 4/ Date:                 TX#: 5/ Date:    Tx#: 6/   Neuro:   Supine endurance contractions with breathing x 10    Supine elevator ascents x 10    Supine elevator descents x 10    Supine quick flicks 3 x 5    Supine TvA/PFM activation with coordinated breathing --verbal and tactile cues used including \"zip up the pelvic floor to TvA\" x 10    Quadruped PFM/TvA pre-activation with tactile cues x 10        There-ex:   Supine fitball squeeze with PFM activation x 10    Supine bridge with PFM pre-activation x 20    Supine SLR with PFM/TvA pre-activation x 10 B    SL TvA activation x 10 B                         HEP: elevators, quick flicks, endurance contractions     Charges: neuro x 2, ther-ex x 1       Total Timed Treatment: 45 min  Total Treatment Time: 45 min

## 2023-04-28 NOTE — PROGRESS NOTES
Diagnosis:    (normal spontaneous vaginal delivery) (O80)    Referring Provider: Kiran Houston  Date of Evaluation:    23    Precautions:  None Next MD visit:   none scheduled  Date of Surgery: n/a   Insurance Primary/Secondary: Juliana Hull / N/A     # Auth Visits: 10           Subjective: Reports that she was sore for a couple days after last session, but then it resolved. Been feeling good overall. Pain: 0/10      Objective:   Fair TvA activation bilaterally, improvement with verbal/tactile cues  Good breath regulation with coordinated PFM contraction   ASLR: slight APT with SLR on L>R      Assessment: Continued to progress challenging PFM/TvA activation exercise in supine, SL, quadruped and seated. Patient tolerated all exercise well with at least fair ability to maintain PFM and/or TvA activation, but she did express requiring increased concentration/effort, especially when incorporating breathing. Updated HEP to include exercises performed in-session today. Plan to continue to progress upright exercise to maximize carryover to ADL. Goals: (to be met in 10 visits)  Patient will improve PERF score to at least 3+/8/8//10 for optimal pelvic floor function and pelvic organ support. Patient will report consistent grade 4 on BSS for improved bowel emptying without straining. Patient will demonstrate ability to sustain a PFM contraction while lifting a 10lb object from the ground for safe return to household and occupational lifting. Patient will report use of KNACK contraction at least 75% of the time to re-establish cough reflex and mitigate urinary leakage with increases in intra-abdominal pressure. Patient will report adherence to HEP for continued exercise benefits  following cessation of PT. Plan: Continue with PFM/TvA co-contraction training. Continue to progress supine, SL, and seated exercise. Progress upright exercise. Discuss lifting mechanics and provide handout.   Date: 4/21/2023  TX#: 2/10 Date: 4/28/23                 TX#: 3/10 Date:                 TX#: 4/ Date:                 TX#: 5/ Date:    Tx#: 6/   Neuro:   Supine endurance contractions with breathing x 10    Supine elevator ascents x 10    Supine elevator descents x 10    Supine quick flicks 3 x 5    Supine TvA/PFM activation with coordinated breathing --verbal and tactile cues used including \"zip up the pelvic floor to TvA\" x 10    Quadruped PFM/TvA pre-activation with tactile cues x 10  Neuro:  Supine endurance contractions with breathing x  2 mins    Supine SB press x 10 B    Bird dog crunch x 10 B    Pelvic tilts with PFM activation x 10 (with discussion of postural awareness)        There-ex:   Supine fitball squeeze with PFM activation x 10    Supine bridge with PFM pre-activation x 20    Supine SLR with PFM/TvA pre-activation x 10 B    SL TvA activation x 10 B     There-ex:  Supine SLR with PFM pre-activation x 10    Supine bridges on SB with PFM pre-activation x 10    SL TvA activation with plank ups (knees down) x 10 B    Quadruped fire hydrants with PFM/TvA pre-activation x 8 B    Seated 5lb overhead DB lift with PFM elevator contractions x 10    Plantigrade PFM contract/relax with fitball squeeze x 10    Updated HEP                      HEP: elevators, quick flicks, endurance contractions   Access Code: [email or web address]  Date: 04/28/2023  - Supine Straight Leg Raise with Pelvic Floor Contraction  - 1 x daily - 5 x weekly - 10 reps  - Supine Bridge with Pelvic Floor Contraction on Swiss Ball  - 1 x daily - 5 x weekly - 15 reps  - Bird Dog with Knee Taps  - 1 x daily - 5 x weekly - 10 reps  - Side Plank on Knees  - 1 x daily - 5 x weekly - 10 reps  - Sit to Stand with Pelvic Floor Contraction  - 1 x daily - 5 x weekly - 15 reps  - Seated Exhale with Pelvic Floor Contraction and Med Ball Lift  - 1 x daily - 5 x weekly - 10 reps  - Seated Pelvic Tilt  - 1 x daily - 5 x weekly - 15 reps    Charges: neuro x 1, ther-ex x 2 Total Timed Treatment: 45 min  Total Treatment Time: 45 min

## 2023-05-04 NOTE — TELEPHONE ENCOUNTER
Patient notified that taking Zyrtec is safe while breastfeeding  She has an antihistamine that is comparable to benadryl. She knows it is safe to take and in the future to contact the the baby's pediatrician for any future questions about what she can take or not take while breastfeeding. Patient verbalizes understanding.

## 2023-05-04 NOTE — TELEPHONE ENCOUNTER
Pt states she is breastfeeding and is wanting to take an allergy pill.  Directions say to call if breastfeeding

## 2023-05-12 NOTE — PROGRESS NOTES
Diagnosis:    (normal spontaneous vaginal delivery) (O80)    Referring Provider: Carrol Galdamez  Date of Evaluation:    23    Precautions:  None Next MD visit:   none scheduled  Date of Surgery: n/a   Insurance Primary/Secondary: Mari Santiago / N/A     # Auth Visits: 10           Subjective: Wants to focus on deep core exercise for next two sessions. Feels that she will be ready to be done next week. Pain: 0/10      Objective:   Fair TvA activation bilaterally, improvement with verbal/tactile cues  Good breath regulation with coordinated PFM contraction   ASLR: slight APT with SLR on L>R      Assessment: Continued to progress challenging deep core stabilization exercise this date in various positions to optimize lumbopelvic stability when performing ADL including lifting/carrying her infant daughter. Added on to HEP today and provided handout on optimal lifting/carrying mechanics. Plan for one additional session with continued focus on core stability and then discharge if all goals have been met and patient feels comfortable with independent exercise moving forward. Goals: (to be met in 10 visits)  Patient will improve PERF score to at least 3+/8/8//10 for optimal pelvic floor function and pelvic organ support. Patient will report consistent grade 4 on BSS for improved bowel emptying without straining. Patient will demonstrate ability to sustain a PFM contraction while lifting a 10lb object from the ground for safe return to household and occupational lifting. Patient will report use of KNACK contraction at least 75% of the time to re-establish cough reflex and mitigate urinary leakage with increases in intra-abdominal pressure. Patient will report adherence to HEP for continued exercise benefits  following cessation of PT. Plan: Continue with PFM/TvA co-contraction training. Continue to progress supine, SL, and seated exercise. Progress upright exercise.    Date: 2023  TX#: 2/10 Date: 4/28/23                 TX#: 3/10 Date:  5/12/23               TX#: 4/10 Date:                 TX#: 5/ Date:    Tx#: 6/   Neuro:   Supine endurance contractions with breathing x 10    Supine elevator ascents x 10    Supine elevator descents x 10    Supine quick flicks 3 x 5    Supine TvA/PFM activation with coordinated breathing --verbal and tactile cues used including \"zip up the pelvic floor to TvA\" x 10    Quadruped PFM/TvA pre-activation with tactile cues x 10  Neuro:  Supine endurance contractions with breathing x  2 mins    Supine SB press x 10 B    Bird dog crunch x 10 B    Pelvic tilts with PFM activation x 10 (with discussion of postural awareness)   Neuro:   Supine SB isometric press into thighs with 3 sec exhale/hold x 10    Supine PFM/TvA activation with legs elevated on SB and 3lb overhead shoulder flexion<>extension, 2 x 10    Quadruped multifidus press ups with adductor squeeze 2 x 10    Seated SB overhead 5lb DB press with march x 10 B    SB reverse crunch x 20       There-ex:   Supine fitball squeeze with PFM activation x 10    Supine bridge with PFM pre-activation x 20    Supine SLR with PFM/TvA pre-activation x 10 B    SL TvA activation x 10 B     There-ex:  Supine SLR with PFM pre-activation x 10    Supine bridges on SB with PFM pre-activation x 10    SL TvA activation with plank ups (knees down) x 10 B    Quadruped fire hydrants with PFM/TvA pre-activation x 8 B    Seated 5lb overhead DB lift with PFM elevator contractions x 10    Plantigrade PFM contract/relax with fitball squeeze x 10    Updated HEP   There-ex:  Bridge with 5lb overhead press and TvA activation 2 x 10    SL TvA activation with plank ups (knees down) x 10 B    Standing cross body press and reach with 5lb DB and core pre-activation x 20 B    Standing floor to overhead press with TvA/PFM prior activation x 10    Kneel to overhead press on foam with 5lb DB x 10                       HEP: elevators, quick flicks, endurance contractions   Access Code: [email or web address]  Date: 04/28/2023  - Supine Straight Leg Raise with Pelvic Floor Contraction  - 1 x daily - 5 x weekly - 10 reps  - Supine Bridge with Pelvic Floor Contraction on Swiss Ball  - 1 x daily - 5 x weekly - 15 reps  - Bird Dog with Knee Taps  - 1 x daily - 5 x weekly - 10 reps  - Side Plank on Knees  - 1 x daily - 5 x weekly - 10 reps  - Sit to Stand with Pelvic Floor Contraction  - 1 x daily - 5 x weekly - 15 reps  - Seated Exhale with Pelvic Floor Contraction and Med Ball Lift  - 1 x daily - 5 x weekly - 10 reps  - Seated Pelvic Tilt  - 1 x daily - 5 x weekly - 15 reps  Access Code: [email or web address]  Date: 05/12/2023  - Abdominal Press into [place]  - 1 x daily - 5 x weekly - 2 sets - 10 reps  - Supine Bridge with Baby and The [place]  - 1 x daily - 5 x weekly - 2 sets - 10 reps  - Multifidus Knee Lift Off With Transverse Abdominis and Pelvic Floor Contraction  - 1 x daily - 5 x weekly - 2 sets - 10 reps  - Swiss Ball Reverse Crunch  - 1 x daily - 5 x weekly - 2 sets - 10 reps  - Side Stepping with Resistance at Feet  - 1 x daily - 5 x weekly - 2 sets - 10 reps  - Side Plank on Knees  - 1 x daily - 5 x weekly - 2 sets - 10 reps    Charges: neuro x 1, ther-ex x 2       Total Timed Treatment: 45 min  Total Treatment Time: 45 min

## 2023-05-19 NOTE — PROGRESS NOTES
Rosiecarlos  Pt has attended 5 visits in Physical Therapy. Diagnosis:    (normal spontaneous vaginal delivery) (O80)    Referring Provider: Shahbaz Alberto  Date of Evaluation:    23    Precautions:  None Next MD visit:   none scheduled  Date of Surgery: n/a   Insurance Primary/Secondary: Fito Izquierdo / N/A     # Auth Visits: 10           Subjective: Reports feeling good overall; feels ready to be done with PT today. Pain: 0/10      Objective:   Good TvA activation bilaterally  Good breath regulation with coordinated PFM contraction   Improving PFM endurance, fast twitch muscle recruitment, proprioceptive awareness      Assessment: At this time, patient has met majority of LTGs. She demonstrates improved postural awareness and PFM engagement ability in isolation, as well as in conjunction with functional movements. She endorses adherence to HEP and verbalizes intent to continue to perform PFM activation exercise, as well as to be more mindful of breath regulation for pressure management moving forward. She reports feeling ready to perform exercises independently at this time. Updated HEP and discussed ideal frequency of exercise. Jake Ospina will be discharged today. Goals: (to be met in 10 visits)  Patient will improve PERF score to at least 3+/8/8//10 for optimal pelvic floor function and pelvic organ support. Mostly met. Patient will report consistent grade 4 on BSS for improved bowel emptying without straining. Met. Patient will demonstrate ability to sustain a PFM contraction while lifting a 10lb object from the ground for safe return to household and occupational lifting. Met. Patient will report use of KNACK contraction at least 75% of the time to re-establish cough reflex and mitigate urinary leakage with increases in intra-abdominal pressure. Met. Patient will report adherence to HEP for continued exercise benefits  following cessation of PT. Met.       Sincerely,  Electronically signed by therapist: Winter Russell PT     [de-identified] certification required:  No       Date: 4/21/2023  TX#: 2/10 Date: 4/28/23                 TX#: 3/10 Date:  5/12/23               TX#: 4/10 Date: 5/19/23                TX#: 5/10 Date:    Tx#: 6/   Neuro:   Supine endurance contractions with breathing x 10    Supine elevator ascents x 10    Supine elevator descents x 10    Supine quick flicks 3 x 5    Supine TvA/PFM activation with coordinated breathing --verbal and tactile cues used including \"zip up the pelvic floor to TvA\" x 10    Quadruped PFM/TvA pre-activation with tactile cues x 10  Neuro:  Supine endurance contractions with breathing x  2 mins    Supine SB press x 10 B    Bird dog crunch x 10 B    Pelvic tilts with PFM activation x 10 (with discussion of postural awareness)   Neuro:   Supine SB isometric press into thighs with 3 sec exhale/hold x 10    Supine PFM/TvA activation with legs elevated on SB and 3lb overhead shoulder flexion<>extension, 2 x 10    Quadruped multifidus press ups with adductor squeeze 2 x 10    Seated SB overhead 5lb DB press with march x 10 B    SB reverse crunch x 20   Neuro: 14'  1/2 kneeling with PFM pre-activation x 10 B    Paloff walk out with press x 10    Standing blue TB rotations x 10 B    Single leg balance with PFM contract/relax x 10 B    There-ex:   Supine fitball squeeze with PFM activation x 10    Supine bridge with PFM pre-activation x 20    Supine SLR with PFM/TvA pre-activation x 10 B    SL TvA activation x 10 B     There-ex:  Supine SLR with PFM pre-activation x 10    Supine bridges on SB with PFM pre-activation x 10    SL TvA activation with plank ups (knees down) x 10 B    Quadruped fire hydrants with PFM/TvA pre-activation x 8 B    Seated 5lb overhead DB lift with PFM elevator contractions x 10    Plantigrade PFM contract/relax with fitball squeeze x 10    Updated HEP   There-ex:  Bridge with 5lb overhead press and TvA activation 2 x 10    SL TvA activation with plank ups (knees down) x 10 B    Standing cross body press and reach with 5lb DB and core pre-activation x 20 B    Standing floor to overhead press with TvA/PFM prior activation x 10    Kneel to overhead press on foam with 5lb DB x 10     There-ex: 34'  Supine bridge to hamstring curl with PFM pre-activation x 10    Goblet squats with PFM pre-activation x 10    Standing hip extensions with blue theraband x 10 with PFM activation, x 10    Lunge pulses with weight (5lb) and PFM activation x10 B    Heel raise with pelvic tilt on wall and PFM activation x 10    Added on to HEP                  HEP: elevators, quick flicks, endurance contractions   Access Code: [email or web address]  Date: 04/28/2023  - Supine Straight Leg Raise with Pelvic Floor Contraction  - 1 x daily - 5 x weekly - 10 reps  - Supine Bridge with Pelvic Floor Contraction on Swiss Ball  - 1 x daily - 5 x weekly - 15 reps  - Bird Dog with Knee Taps  - 1 x daily - 5 x weekly - 10 reps  - Side Plank on Knees  - 1 x daily - 5 x weekly - 10 reps  - Sit to Stand with Pelvic Floor Contraction  - 1 x daily - 5 x weekly - 15 reps  - Seated Exhale with Pelvic Floor Contraction and Med Ball Lift  - 1 x daily - 5 x weekly - 10 reps  - Seated Pelvic Tilt  - 1 x daily - 5 x weekly - 15 reps  Access Code: [email or web address]  Date: 05/12/2023  - Abdominal Press into [place]  - 1 x daily - 5 x weekly - 2 sets - 10 reps  - Supine Bridge with Baby and Swiss Ball  - 1 x daily - 5 x weekly - 2 sets - 10 reps  - Multifidus Knee Lift Off With Transverse Abdominis and Pelvic Floor Contraction  - 1 x daily - 5 x weekly - 2 sets - 10 reps  - Swiss Ball Reverse Crunch  - 1 x daily - 5 x weekly - 2 sets - 10 reps  - Side Stepping with Resistance at Feet  - 1 x daily - 5 x weekly - 2 sets - 10 reps  - Side Plank on Knees  - 1 x daily - 5 x weekly - 2 sets - 10 reps  Access Code: [email or web address]  Date: 05/19/2023  - Single Leg Balance with Pelvic Floor Contraction  - 1 x daily - 5 x weekly - 10 reps  - Standing Bicep Curl with Pelvic Floor Contraction  - 1 x daily - 5 x weekly - 10 reps  - Chops with Pelvic Floor Contraction  - 1 x daily - 5 x weekly - 10 reps  - Standing Pelvic Tilts with Heel Lift At Wall With Pelvic Floor Contraction  - 1 x daily - 5 x weekly - 10 reps  - Standing Plie With Pelvic Floor Contraction  - 1 x daily - 5 x weekly - 10 reps  - Goblet Squat with Kettlebell  - 1 x daily - 5 x weekly - 10 reps  - Box Lift from [employer] with Pelvic Floor Contraction  - 1 x daily - 5 x weekly - 10 reps    Charges: neuro x 1, ther-ex x 2       Total Timed Treatment: 48 min  Total Treatment Time: 48 min

## 2024-04-18 ENCOUNTER — PATIENT MESSAGE (OUTPATIENT)
Dept: OBGYN CLINIC | Facility: CLINIC | Age: 34
End: 2024-04-18

## 2024-04-23 NOTE — TELEPHONE ENCOUNTER
From: Shannan Jerry  To: Leana Chowdhury  Sent: 4/18/2024 10:56 AM CDT  Subject: Breast tenderness     Hello,  For the past few weeks I’ve been experiencing mild discomfort in my right breast. I’ve been nursing for the past 14 months and recently started my period again, I’ve had two cycles. I’ve had several clogged milk ducts through my nursing journey and recently had what I believe was a clog on the nipple which bled and slowly healed. Sometimes in the morning I feel an oozing sensation but won’t see any milk. I’m not sure if these are changes due to nursing and/or my period coming back or if it’s something I need to get checked out. I don’t feel any lumps or masses but have a doll discomfort in the breast and nipple that comes and goes.     Thank you,  Shannan

## 2024-04-24 ENCOUNTER — OFFICE VISIT (OUTPATIENT)
Dept: OBGYN CLINIC | Facility: CLINIC | Age: 34
End: 2024-04-24
Payer: COMMERCIAL

## 2024-04-24 VITALS
HEIGHT: 65 IN | DIASTOLIC BLOOD PRESSURE: 66 MMHG | HEART RATE: 77 BPM | WEIGHT: 132.38 LBS | SYSTOLIC BLOOD PRESSURE: 104 MMHG | BODY MASS INDEX: 22.06 KG/M2

## 2024-04-24 DIAGNOSIS — N64.4 BREAST PAIN: Primary | ICD-10-CM

## 2024-04-24 PROCEDURE — 99213 OFFICE O/P EST LOW 20 MIN: CPT | Performed by: NURSE PRACTITIONER

## 2024-04-24 NOTE — PROGRESS NOTES
Gyne note     S: patient is a 33 year old yo  here for a recent onset of increased breast changed in the last 2 months.     She recently started menstruating again, she has had 2 cycles. She is still nursing her daughter in the morning and at bedtime. Her right breast has typically been favored by her. She has had more clogged ducts on this side.     She started to have intermittent generalized discomfort in the breast. She also feels an oozing sensation from the nipple. Sometimes there is drainage and other times there isn't. There is a small area on the nipple that is cracked. It has a white flakey appearance that she can pick off but it comes back. It has had some bleeding and the mild has come out with some blood tinge a few times.    She hasn't specifically correlated if this pain has had any pattern associated with her menstrual cycle onset.     Review of Systems:  General: denies fevers, chills, fatigue and malaise.     O:/66   Pulse 77   Ht 65\"   Wt 132 lb 6 oz (60 kg)   LMP 2024 (Exact Date)   Breastfeeding Yes   BMI 22.03 kg/m²   Gen NAD     Bilateral breasts are dense without any masses, skin changes to the breast, nipple retraction, or axillary adenopathy. The left nipple appears normal without lesions. There is a duct to the right nipple that has mild inflammation.          A/P:  1. Breast pain  - US BREAST RIGHT COMPLETE (CPT=76641); Future    2. Postpartum nipple cracking (HCC)  It is possible this could be yeast    She can try an over the counter Miconazole or Clotrimazole BID for 10 days- wash off completely prior to nursing

## 2024-04-29 ENCOUNTER — TELEPHONE (OUTPATIENT)
Dept: OBGYN CLINIC | Facility: CLINIC | Age: 34
End: 2024-04-29

## 2024-04-29 DIAGNOSIS — N64.4 BREAST PAIN: Primary | ICD-10-CM

## 2024-04-29 NOTE — TELEPHONE ENCOUNTER
Patient called for follow up.   No answer, voicemail left with instructions to call back.   Office number provided.

## 2024-05-09 ENCOUNTER — HOSPITAL ENCOUNTER (OUTPATIENT)
Dept: MAMMOGRAPHY | Facility: HOSPITAL | Age: 34
Discharge: HOME | End: 2024-05-09
Attending: NURSE PRACTITIONER
Payer: COMMERCIAL

## 2024-05-09 DIAGNOSIS — N64.4 BREAST PAIN: ICD-10-CM

## 2024-05-09 PROCEDURE — 77066 DX MAMMO INCL CAD BI: CPT | Performed by: NURSE PRACTITIONER

## 2024-05-09 PROCEDURE — 76642 ULTRASOUND BREAST LIMITED: CPT | Performed by: NURSE PRACTITIONER

## 2024-05-09 PROCEDURE — 77062 BREAST TOMOSYNTHESIS BI: CPT | Performed by: NURSE PRACTITIONER

## 2024-05-10 NOTE — PROGRESS NOTES
Called to follow up with patient.     Discussed provider review and recommendations.   Patient reports did use OTC miconazole and saw good improvement in symptoms.     No further questions or concerns at this time.

## 2024-12-02 ENCOUNTER — TELEPHONE (OUTPATIENT)
Dept: OBGYN CLINIC | Facility: CLINIC | Age: 34
End: 2024-12-02

## 2024-12-02 DIAGNOSIS — N91.2 AMENORRHEA: Primary | ICD-10-CM

## 2024-12-02 NOTE — TELEPHONE ENCOUNTER
Patient calling to initiate prenatal care  LMP 10-31-24  Patient is 7-8 weeks on 12-26  Confirmation Ultrasound and Appointment scheduled on ....................  Future Appointments   Date Time Provider Department Center   1/10/2025 11:45 AM [facility] [address]   1/10/2025  1:30 PM France Rosales MD [facility] [address]   1/24/2025  2:00 PM Tona Sanches DO [facility] [address]         Any history of ectopic pregnancy? no  Any history of miscarriage? no  Any medications that you are taking on a regular basis other than prenatal vitamins? magnesium (if not taking prenatal vitamins, encourage patient to start taking.)  Any bleeding since the first day of last LMP and your positive pregnancy test? no    Insurance [facility] ppo

## 2024-12-19 ENCOUNTER — PATIENT MESSAGE (OUTPATIENT)
Dept: OBGYN CLINIC | Facility: CLINIC | Age: 34
End: 2024-12-19

## 2024-12-20 NOTE — TELEPHONE ENCOUNTER
Patient is 7 weeks pregnant.  Reports itchiness over last 2 weeks, primarily at night.  Affected areas include back when lying, torso and back of legs.  Denies itchy hands/soles of feet, rash, nausea, loss of appetite, pain, cramping or bleeding/spotting.  Patient is prone to dryer skin during winter months but has not experienced ithy skin in the past or with last pregnancy.   Patient applying lotion per usual routine but not using any other product to treat itch.     Future Appointments   Date Time Provider Department Center   1/10/2025 11:45 AM [facility] [address]   1/10/2025  1:30 PM France Rosales MD [facility] [address]   1/24/2025  2:00 PM Tona Sanches DO [facility] [address]      Routed to provider for review/recommendations.

## 2025-01-10 ENCOUNTER — OFFICE VISIT (OUTPATIENT)
Dept: OBGYN CLINIC | Facility: CLINIC | Age: 35
End: 2025-01-10
Payer: COMMERCIAL

## 2025-01-10 ENCOUNTER — TELEPHONE (OUTPATIENT)
Dept: OBGYN CLINIC | Facility: CLINIC | Age: 35
End: 2025-01-10

## 2025-01-10 ENCOUNTER — ULTRASOUND ENCOUNTER (OUTPATIENT)
Dept: OBGYN CLINIC | Facility: CLINIC | Age: 35
End: 2025-01-10
Payer: COMMERCIAL

## 2025-01-10 VITALS
BODY MASS INDEX: 24 KG/M2 | DIASTOLIC BLOOD PRESSURE: 74 MMHG | HEART RATE: 78 BPM | SYSTOLIC BLOOD PRESSURE: 126 MMHG | WEIGHT: 142.81 LBS

## 2025-01-10 DIAGNOSIS — N91.2 AMENORRHEA: ICD-10-CM

## 2025-01-10 DIAGNOSIS — O02.1 MISSED AB (HCC): Primary | ICD-10-CM

## 2025-01-10 DIAGNOSIS — O02.1 MISSED ABORTION (HCC): Primary | ICD-10-CM

## 2025-01-10 PROCEDURE — 76856 US EXAM PELVIC COMPLETE: CPT | Performed by: OBSTETRICS & GYNECOLOGY

## 2025-01-10 PROCEDURE — 99214 OFFICE O/P EST MOD 30 MIN: CPT | Performed by: OBSTETRICS & GYNECOLOGY

## 2025-01-10 PROCEDURE — 10D17ZZ EXTRACTION OF PRODUCTS OF CONCEPTION, RETAINED, VIA NATURAL OR ARTIFICIAL OPENING: ICD-10-PCS | Performed by: OBSTETRICS & GYNECOLOGY

## 2025-01-10 NOTE — PROGRESS NOTES
[facility]  Obstetrics and Gynecology    Subjective:     Shannan Joel is a 34 year old  who presents with c/o secondary amenorrhea and positive home pregnancy test, found to have missed AB on ultrasound today. The patient reports feeling very surprised by ultrasound results today because she has been feeling well. Denies abdominal/pelvic pain, VB, abnormal discharge. Patient's last menstrual period was 2024 (exact date).. Pt reports regular menstrual cycles. She was actively trying to conceive.     Review of Systems:  General:  denies fevers, chills, fatigue and malaise.   Respiratory:  denies SOB, dyspnea, cough or wheezing  Cardiovascular:  denies chest pain, palpitations, exercise intolerance   GI: denies abdominal pain, diarrhea, constipation  :  denies dysuria, hematuria, increased urinary frequency.  denies abnormal uterine bleeding or vaginal discharge.       Objective:     Vitals:    01/10/25 1248   BP: 126/74   Pulse: 78   Weight: 142 lb 12.8 oz (64.8 kg)       Body mass index is 23.76 kg/m².    General: AAOx3, NAD, appears well  Resp: unlabored breathing    Imaging:  US 1/10/2025: +GS, +YS, +FP with CRL 1.68 cm c/w 8w1d. No fetal cardiac activity.  No free fluid, adnexa appear normal.       Assessment:     Shannan Joel is a 34 year old  with MAB.      Plan:     Reviewed results with patient and her significant other. Expressed condolences and provided emotional support.  Reassured patient that while unfortunately pregnancy loss is very common, it is much less likely to experience multiple miscarriages. The majority of people will have a healthy pregnancy and baby following a miscarriage. The most common cause of first trimester miscarriage is sporadic chromosome abnormality and unlikely to recur.  - d/w patient management options including observation versus medical versus surgical (suction D&C)  - d/w patient risks, benefits and alternatives of each form of  management    - r/b/a to medical management were discussed including but not limited to risk of heavy vaginal bleeding, severe abdominal pain, failed medical management, prolongation of SAB, RPOC and emergent services including surgical management    - r/b/a to suction D&C were discussed including but not limited to risk of infection, bleeding and/or blood transfusion, and rare but possible risk of injury to surrounding organs such as bowel, bladder, ureters, nerves, blood vessels. Can offer genetic testing if suction D&C performed.     She prefers to proceed with suction D&C and would like to schedule as soon as possible. Declines chromosome testing.      All of the findings and plan were discussed with the patient.  She notes understanding and agrees with the plan of care.    All questions were answered to the best of my ability at this time.    Total time was 30 minutes, more than 50% of the time was spent in face-to-face counseling and/or coordination of care.      France Braun MD  [facility]  1/10/2025 12:57 PM

## 2025-01-10 NOTE — TELEPHONE ENCOUNTER
Surgery scheduled for 25 at 1230pm  Post op   Future Appointments   Date Time Provider Department Center   2025  2:00 PM Tona Sanches DO [facility] [address]   2025 11:30 AM France Rosales MD [facility] [address]         Orders entered  Added to calendar   PA -     Procedure code  52116  Description  Treatment of missed , completed surgically; first trimester  Inquiry summary  Notification/Prior Authorization not required for this service.

## 2025-01-10 NOTE — TELEPHONE ENCOUNTER
----- Message from France Rosales sent at 1/10/2025  1:31 PM CST -----  Regarding: suction D&C  Surgeon: Dr. Nicholas Latif MD  Assistant: No     Type of Admit: Outpatient Surgery  Procedure Location: Main OR    PreOp Dx: missed     Procedure: dilatation and curettage with suction    Anesthesia: MAC per anesthesia    Special Equipment/Comments: ultrasound at bedside    Antibiotics: 200mg doxycycline PO pre-op. No penicillin or cephalosporin allergy.     Pre Op Orders: initiate my Pre-op standing orders, if none exist please use [facility]'s Standing Order     Labs: Type and screen, H&H, quant beta hcg    Medical clearance: No    Post-op follow up appointment: 2 week

## 2025-01-11 ENCOUNTER — ANESTHESIA (OUTPATIENT)
Dept: SURGERY | Facility: HOSPITAL | Age: 35
End: 2025-01-11
Payer: COMMERCIAL

## 2025-01-11 ENCOUNTER — ANESTHESIA EVENT (OUTPATIENT)
Dept: SURGERY | Facility: HOSPITAL | Age: 35
End: 2025-01-11
Payer: COMMERCIAL

## 2025-01-11 ENCOUNTER — MOBILE ENCOUNTER (OUTPATIENT)
Dept: OBGYN CLINIC | Facility: CLINIC | Age: 35
End: 2025-01-11

## 2025-01-11 ENCOUNTER — HOSPITAL ENCOUNTER (OUTPATIENT)
Facility: HOSPITAL | Age: 35
Setting detail: HOSPITAL OUTPATIENT SURGERY
Discharge: HOME | End: 2025-01-11
Attending: OBSTETRICS & GYNECOLOGY | Admitting: OBSTETRICS & GYNECOLOGY
Payer: COMMERCIAL

## 2025-01-11 VITALS
SYSTOLIC BLOOD PRESSURE: 91 MMHG | BODY MASS INDEX: 23.4 KG/M2 | WEIGHT: 140.44 LBS | RESPIRATION RATE: 16 BRPM | HEIGHT: 65 IN | TEMPERATURE: 99 F | OXYGEN SATURATION: 100 % | DIASTOLIC BLOOD PRESSURE: 61 MMHG | HEART RATE: 60 BPM

## 2025-01-11 DIAGNOSIS — Z01.818 PRE-OP TESTING: Primary | ICD-10-CM

## 2025-01-11 DIAGNOSIS — O02.1 MISSED ABORTION (HCC): ICD-10-CM

## 2025-01-11 LAB
ANTIBODY SCREEN: NEGATIVE
ERYTHROCYTE [DISTWIDTH] IN BLOOD BY AUTOMATED COUNT: 12.3 %
HCT VFR BLD AUTO: 38.1 %
HGB BLD-MCNC: 13 G/DL
MCH RBC QN AUTO: 29.9 PG (ref 26–34)
MCHC RBC AUTO-ENTMCNC: 34.1 G/DL (ref 31–37)
MCV RBC AUTO: 87.6 FL
PLATELET # BLD AUTO: 186 10(3)UL (ref 150–450)
RBC # BLD AUTO: 4.35 X10(6)UL
RH BLOOD TYPE: NEGATIVE
WBC # BLD AUTO: 6.4 X10(3) UL (ref 4–11)

## 2025-01-11 PROCEDURE — 59820 CARE OF MISCARRIAGE: CPT | Performed by: OBSTETRICS & GYNECOLOGY

## 2025-01-11 RX ORDER — DEXAMETHASONE SODIUM PHOSPHATE 4 MG/ML
VIAL (ML) INJECTION AS NEEDED
Status: DISCONTINUED | OUTPATIENT
Start: 2025-01-11 | End: 2025-01-11 | Stop reason: SURG

## 2025-01-11 RX ORDER — ONDANSETRON 2 MG/ML
INJECTION INTRAMUSCULAR; INTRAVENOUS AS NEEDED
Status: DISCONTINUED | OUTPATIENT
Start: 2025-01-11 | End: 2025-01-11 | Stop reason: SURG

## 2025-01-11 RX ORDER — HYDROMORPHONE HYDROCHLORIDE 1 MG/ML
0.6 INJECTION, SOLUTION INTRAMUSCULAR; INTRAVENOUS; SUBCUTANEOUS EVERY 5 MIN PRN
Status: DISCONTINUED | OUTPATIENT
Start: 2025-01-11 | End: 2025-01-11

## 2025-01-11 RX ORDER — HYDROMORPHONE HYDROCHLORIDE 1 MG/ML
0.4 INJECTION, SOLUTION INTRAMUSCULAR; INTRAVENOUS; SUBCUTANEOUS EVERY 5 MIN PRN
Status: DISCONTINUED | OUTPATIENT
Start: 2025-01-11 | End: 2025-01-11

## 2025-01-11 RX ORDER — ONDANSETRON 2 MG/ML
4 INJECTION INTRAMUSCULAR; INTRAVENOUS EVERY 6 HOURS PRN
Status: DISCONTINUED | OUTPATIENT
Start: 2025-01-11 | End: 2025-01-11

## 2025-01-11 RX ORDER — SODIUM CHLORIDE, SODIUM LACTATE, POTASSIUM CHLORIDE, CALCIUM CHLORIDE 600; 310; 30; 20 MG/100ML; MG/100ML; MG/100ML; MG/100ML
INJECTION, SOLUTION INTRAVENOUS CONTINUOUS
Status: DISCONTINUED | OUTPATIENT
Start: 2025-01-11 | End: 2025-01-11

## 2025-01-11 RX ORDER — MIDAZOLAM HYDROCHLORIDE 1 MG/ML
1 INJECTION INTRAMUSCULAR; INTRAVENOUS EVERY 5 MIN PRN
Status: DISCONTINUED | OUTPATIENT
Start: 2025-01-11 | End: 2025-01-11

## 2025-01-11 RX ORDER — SCOPOLAMINE 1 MG/3D
1 PATCH, EXTENDED RELEASE TRANSDERMAL ONCE
Status: DISCONTINUED | OUTPATIENT
Start: 2025-01-11 | End: 2025-01-11 | Stop reason: HOSPADM

## 2025-01-11 RX ORDER — HYDROCODONE BITARTRATE AND ACETAMINOPHEN 5; 325 MG/1; MG/1
2 TABLET ORAL EVERY 6 HOURS PRN
Status: DISCONTINUED | OUTPATIENT
Start: 2025-01-11 | End: 2025-01-11

## 2025-01-11 RX ORDER — ACETAMINOPHEN 325 MG/1
650 TABLET ORAL EVERY 6 HOURS PRN
Status: DISCONTINUED | OUTPATIENT
Start: 2025-01-11 | End: 2025-01-11

## 2025-01-11 RX ORDER — NALOXONE HYDROCHLORIDE 0.4 MG/ML
0.08 INJECTION, SOLUTION INTRAMUSCULAR; INTRAVENOUS; SUBCUTANEOUS AS NEEDED
Status: DISCONTINUED | OUTPATIENT
Start: 2025-01-11 | End: 2025-01-11

## 2025-01-11 RX ORDER — ACETAMINOPHEN 500 MG
1000 TABLET ORAL ONCE
Status: DISCONTINUED | OUTPATIENT
Start: 2025-01-11 | End: 2025-01-11 | Stop reason: HOSPADM

## 2025-01-11 RX ORDER — IBUPROFEN 200 MG
600 TABLET ORAL EVERY 6 HOURS
Status: DISCONTINUED | OUTPATIENT
Start: 2025-01-11 | End: 2025-01-11

## 2025-01-11 RX ORDER — HYDROMORPHONE HYDROCHLORIDE 1 MG/ML
0.2 INJECTION, SOLUTION INTRAMUSCULAR; INTRAVENOUS; SUBCUTANEOUS EVERY 5 MIN PRN
Status: DISCONTINUED | OUTPATIENT
Start: 2025-01-11 | End: 2025-01-11

## 2025-01-11 RX ORDER — MEPERIDINE HYDROCHLORIDE 25 MG/ML
12.5 INJECTION INTRAMUSCULAR; INTRAVENOUS; SUBCUTANEOUS AS NEEDED
Status: DISCONTINUED | OUTPATIENT
Start: 2025-01-11 | End: 2025-01-11

## 2025-01-11 RX ORDER — ONDANSETRON 4 MG/1
4 TABLET, FILM COATED ORAL EVERY 8 HOURS PRN
Status: DISCONTINUED | OUTPATIENT
Start: 2025-01-11 | End: 2025-01-11

## 2025-01-11 RX ORDER — ONDANSETRON 2 MG/ML
4 INJECTION INTRAMUSCULAR; INTRAVENOUS EVERY 8 HOURS PRN
Status: DISCONTINUED | OUTPATIENT
Start: 2025-01-11 | End: 2025-01-11

## 2025-01-11 RX ORDER — HYDROCODONE BITARTRATE AND ACETAMINOPHEN 5; 325 MG/1; MG/1
2 TABLET ORAL ONCE AS NEEDED
Status: DISCONTINUED | OUTPATIENT
Start: 2025-01-11 | End: 2025-01-11

## 2025-01-11 RX ORDER — KETOROLAC TROMETHAMINE 30 MG/ML
INJECTION, SOLUTION INTRAMUSCULAR; INTRAVENOUS AS NEEDED
Status: DISCONTINUED | OUTPATIENT
Start: 2025-01-11 | End: 2025-01-11 | Stop reason: SURG

## 2025-01-11 RX ORDER — PROCHLORPERAZINE EDISYLATE 5 MG/ML
5 INJECTION INTRAMUSCULAR; INTRAVENOUS EVERY 8 HOURS PRN
Status: DISCONTINUED | OUTPATIENT
Start: 2025-01-11 | End: 2025-01-11

## 2025-01-11 RX ORDER — HYDROCODONE BITARTRATE AND ACETAMINOPHEN 5; 325 MG/1; MG/1
1 TABLET ORAL EVERY 6 HOURS PRN
Status: DISCONTINUED | OUTPATIENT
Start: 2025-01-11 | End: 2025-01-11

## 2025-01-11 RX ORDER — ACETAMINOPHEN 500 MG
1000 TABLET ORAL ONCE AS NEEDED
Status: DISCONTINUED | OUTPATIENT
Start: 2025-01-11 | End: 2025-01-11

## 2025-01-11 RX ORDER — HYDROCODONE BITARTRATE AND ACETAMINOPHEN 5; 325 MG/1; MG/1
1 TABLET ORAL ONCE AS NEEDED
Status: DISCONTINUED | OUTPATIENT
Start: 2025-01-11 | End: 2025-01-11

## 2025-01-11 RX ADMIN — DEXAMETHASONE SODIUM PHOSPHATE 4 MG: 4 MG/ML VIAL (ML) INJECTION at 12:37:00

## 2025-01-11 RX ADMIN — SODIUM CHLORIDE, SODIUM LACTATE, POTASSIUM CHLORIDE, CALCIUM CHLORIDE: 600; 310; 30; 20 INJECTION, SOLUTION INTRAVENOUS at 12:55:00

## 2025-01-11 RX ADMIN — KETOROLAC TROMETHAMINE 30 MG: 30 INJECTION, SOLUTION INTRAMUSCULAR; INTRAVENOUS at 12:46:00

## 2025-01-11 RX ADMIN — ONDANSETRON 4 MG: 2 INJECTION INTRAMUSCULAR; INTRAVENOUS at 12:37:00

## 2025-01-11 NOTE — ANESTHESIA POSTPROCEDURE EVALUATION
[facility]    Shannan Joel Patient Status:  Hospital Outpatient Surgery   Age/Gender 34 year old female MRN [id number]   Location [facility] POST ANESTHESIA CARE UNIT Attending Nicholas Latif MD    Day # 0 PCP Shelby Zhou MD       Anesthesia Post-op Note    DILATION AND CURETTAGE SUCTION    Procedure Summary       Date: 25 Room / Location:  MAIN OR  MAIN OR    Anesthesia Start: 1227 Anesthesia Stop: 1255    Procedure: DILATION AND CURETTAGE SUCTION (Vagina ) Diagnosis:       Missed  (HCC)      (Missed  (HCC) [O02.1])    Surgeons: Nicholas Latif MD Anesthesiologist: Joss Bright MD    Anesthesia Type: MAC ASA Status: 1            Anesthesia Type: MAC    Vitals Value Taken Time   /54 25 1254   Temp 97.4 25 1257   Pulse 72 25 1257   Resp 14 25 1257   SpO2 98 % 25 1257   Vitals shown include unfiled device data.        Patient Location: PACU    Anesthesia Type: MAC    Airway Patency: patent    Postop Pain Control: adequate    Mental Status: mildly sedated but able to meaningfully participate in the post-anesthesia evaluation    Nausea/Vomiting: none    Cardiopulmonary/Hydration status: stable euvolemic    Complications: no apparent anesthesia related complications    Postop vital signs: stable    Dental Exam: Unchanged from Preop    Patient to be discharged from PACU when criteria met.

## 2025-01-11 NOTE — ANESTHESIA PREPROCEDURE EVALUATION
PRE-OP EVALUATION    Patient Name: Shannan Joel    Admit Diagnosis: Missed  (HCC) [O02.1]    Pre-op Diagnosis: Missed  (HCC) [O02.1]    DILATION AND CURETTAGE SUCTION    Anesthesia Procedure: DILATION AND CURETTAGE SUCTION (Vagina )    Surgeons and Role:     * Nicholas Latif MD - Primary    Pre-op vitals reviewed.  Temp: 98.2 °F (36.8 °C)  Pulse: 66  Resp: 16  BP: 104/61  SpO2: 100 %  Body mass index is 23.37 kg/m².    Current medications reviewed.  Hospital Medications:  • [Transfer Hold] acetaminophen (Tylenol Extra Strength) tab 1,000 mg  1,000 mg Oral Once   • [Transfer Hold] scopolamine (Transderm-Scop) 1 MG/3DAYS patch 1 patch  1 patch Transdermal Once   • lactated ringers infusion   Intravenous Continuous   • doxycycline hyclate (Vibramycin) 200 mg in sodium chloride 0.9% 250 mL IVPB  200 mg Intravenous Once   • [Transfer Hold] Rho D immune globulin (Rhophylac) 1500 Units/2mL injection 300 mcg  300 mcg Intravenous Once       Outpatient Medications:   Prescriptions Prior to Admission[1]    Allergies: Levaquin      Anesthesia Evaluation    Patient summary reviewed.    Anesthetic Complications  (-) history of anesthetic complications         GI/Hepatic/Renal    Negative GI/hepatic/renal ROS.                             Cardiovascular    Negative cardiovascular ROS.    Exercise tolerance: good     MET: >4                                           Endo/Other    Negative endo/other ROS.                              Pulmonary    Negative pulmonary ROS.                       Neuro/Psych    Negative neuro/psych ROS.                              Past Surgical History:   Procedure Laterality Date   • Colposcopy, cervix w/upper adjacent vagina; w/biopsy(s), cervix  10-30-14    no bx     Social History     Socioeconomic History   • Marital status:    Tobacco Use   • Smoking status: Never   • Smokeless tobacco: Never   Vaping Use   • Vaping status: Never Used   Substance and Sexual Activity    • Alcohol use: Not Currently     Comment: 1-2 weekly   • Drug use: No   • Sexual activity: Yes     Partners: Male   Other Topics Concern   • Caffeine Concern Yes     Comment: 1-2 cups dalily   • Exercise No     Comment: runs 2-3 x week   • Seat Belt Yes     History   Drug Use No     Available pre-op labs reviewed.  Lab Results   Component Value Date    WBC 6.4 01/11/2025    RBC 4.35 01/11/2025    HGB 13.0 01/11/2025    HCT 38.1 01/11/2025    MCV 87.6 01/11/2025    MCH 29.9 01/11/2025    MCHC 34.1 01/11/2025    RDW 12.3 01/11/2025    .0 01/11/2025               Airway      Mallampati: II  Mouth opening: 3 FB  TM distance: 4 - 6 cm  Neck ROM: full Cardiovascular    Cardiovascular exam normal.         Dental  Comment: Normal wear/minor imperfections and discoloration noted. No grossly weakened or damaged teeth on exam.           Pulmonary    Pulmonary exam normal.                 Other findings        ASA: 1   Plan: MAC  NPO status verified and patient meets guidelines.        Comment: We have decided on MAC for this procedure. MAC stands for Monitored Anesthesia Care and is a type of sedation. The patient will be given medications through the IV to relax the patient and help with pain control. During MAC the patient will be breathing on their own during the case. The patient has a risk of awareness before, during and after the procedure. There is also a risk of requiring general anesthesia and placement of a breathing tube should the patient not breathe well or should MAC not provide adequate sedation for the patient to tolerate the procedure. All questions were answered.                  Present on Admission:  **None**             [1]   Medications Prior to Admission   Medication Sig Dispense Refill Last Dose/Taking   • magnesium 250 MG Oral Tab Take 1 tablet (250 mg total) by mouth.   Past Week   • Prenatal Vit-Fe Fumarate-FA (O-ELSY PRENATAL) Oral Tab Take by mouth.   Past Week

## 2025-01-11 NOTE — H&P
[facility]  Obstetrics and Gynecology  Gynecologic History & Physical    Shannan Joel Patient Status:  Hospital Outpatient Surgery    10/5/1990 MRN [id number]   Location [facility] SURGERY Attending Nicholas Latif MD   Hospital Day 0 PCP Shelby Zhou MD       Subjective:   Chief Complaint: Missed   History of Present Illness: Patient is a 34 year old female  Patient's last menstrual period was 10/31/2024 (exact date). who presents for suction D&C due to 8 wk missed .    OB History    Para Term  AB Living   2 1 1 0 1 1   SAB IAB Ectopic Multiple Live Births   1 0 0 0 1      # Outcome Date GA Lbr Marcelo/2nd Weight Sex Type Anes PTL Lv   2 SAB            1 Term 23 38w3d 08:30 6 lb 13 oz (3.09 kg) F NORMAL SPONT EPI N NICKI     Past Medical History:    Acute bronchitis    Acute pharyngitis    Candidiasis of mouth    Excessive or frequent menstruation    Headache(784.0)    Hx of motion sickness    Pap smear abnormality of cervix with LGSIL    LSIL    Pap smear for cervical cancer screening    wnl    Pap smear for cervical cancer screening    wnl    Papanicolaou smear of cervix with low grade squamous intraepithelial lesion (LGSIL)    Unspecified otitis media    Unspecified sinusitis (chronic)     Past Surgical History:   Procedure Laterality Date    Colposcopy, cervix w/upper adjacent vagina; w/biopsy(s), cervix  10-30-14    no bx     Medications:  Prescriptions Prior to Admission[1]    Allergies:  Allergies[2]     Past GYN History: As above     Social History:  Social History     Tobacco Use    Smoking status: Never    Smokeless tobacco: Never   Substance Use Topics    Alcohol use: Not Currently     Comment: 1-2 weekly      Family History: None pertinent    Review of Systems:  Pertinent items are noted in the  HPI.     Objective:   /61 (BP Location: Left arm)   Pulse 66   Temp 98.2 °F (36.8 °C) (Oral)   Resp 16   Ht 65\"   Wt  140 lb 6.9 oz (63.7 kg)   LMP 10/31/2024 (Exact Date)   SpO2 100%   Breastfeeding No   BMI 23.37 kg/m²    Physical Examination:  General appearance: Well dressed, well nourished in no apparent distress  Neurologic/Psychiatric: Alert and oriented to person, place and time, mood normal, affect appropriate  Lungs: Clear to auscultation bilaterally, no rales, wheezes or rhonchi  Heart:: Regular rate and rhythm, no gallops or murmurs  Abdomen: Soft, non-tender, non-distended, no masses  Pelvic: Deferred to OR  Extremities: No clubbing, cyanosis or edema      Diagnostics:   Transvaginal ultrasound performed 1/10/2025.   Ultrasound performed for secondary amenorrhea.   Patient's last menstrual period was 10/31/24, c/w 10w1d.   +GS, +YS, +FP with CRL 1.68 cm c/w 8w1d. No fetal cardiac activity.   No free fluid, adnexa appear normal.   Impression: Findings are diagnostic of early pregnancy loss, missed .     Assessment/Plan:   Pre-operative Diagnosis:  8 wk missed .  Patient given option of medical therapy, declined.  Risks, benefits and potential complications of suction dilation & curettage reviewed, including anesthesia, infection with need for intravenous antibiotics, bleeding with need for blood transfusion, scarring with effects on future fertility, retained products of conception with need for additional surgery, and uterine perforation with need for hospitalization or additional surgery.  All questions answered and patient agrees to the  proposed surgery without reservation.    Patient Active Problem List   Diagnosis    Familial benign hematuria    Hymenal remnant     Risks, benefits, alternatives and possible complications have been discussed in detail with the patient.  Pre-admission, admission, and post admission procedures and expectations were discussed in detail.  All questions answered, all appropriate consents to be signed at the hospital.    Nicholas Latif MD  [facility]-  Obstetrics & Gynecology          [1]   Medications Prior to Admission   Medication Sig Dispense Refill Last Dose/Taking    magnesium 250 MG Oral Tab Take 1 tablet (250 mg total) by mouth.   Past Week    Prenatal Vit-Fe Fumarate-FA (O-ELSY PRENATAL) Oral Tab Take by mouth.   Past Week   [2]   Allergies  Allergen Reactions    Levaquin NAUSEA ONLY     TABS

## 2025-01-11 NOTE — OPERATIVE REPORT
Procedure Date: 2025    Pre-Operative Diagnosis: Missed , 8 weeks     Post-Operative Diagnosis: Same     Procedure Performed:   Suction dilation and curettage    Surgeon(s) and Role:     * Nicholas Latif MD - Primary    Assistant(s):   none     Surgical Findings: cervix 0 cm dilated     Specimen: Products of conception     Estimated Blood Loss: 10 mL     Procedure:  After obtaining informed consent, the patient was brought into the operating room and placed on the operating room table in supine position.  MAC anesthesia was performed and the patient then placed in dorsal lithotomy position.  The perineum and vagina were then prepped and draped in the usual sterile fashion.  Bimanual examination was then performed and the uterus noted to be 8 weeks sized, anteverted.  The cervix was 0 cm dilated.    A bivalved speculum was placed in the vagina and a single toothed tenaculum used to grasp the anterior lip of the cervix.  The uterus was gently sounded to 7 cm.  The cervix was then dilated to a #7 Hegar dilator.  A #7 curved suction curet was then used to remove the products of conception.  A moderate amount of tissue was obtained and sent to pathology for examination.  Several gentle passes with a sharp edged endometrial curet were also performed to verify complete evacuation of the uterus and a final pass made with the suction curet.    At the end of the procedure, all instruments were removed from the vagina with good hemostasis noted.  The patient was repositioned in the supine position, awakened in the operating room and transferred to the recovery room in stable condition.  Sponge and instrument counts were correct at the end of the procedure.  The patient is A neg.  Rhogam was ordered.      Lab Results   Component Value Date    ABO A 2025    RH Negative 2025

## 2025-01-11 NOTE — DISCHARGE INSTRUCTIONS
Completed Spontaneous Miscarriage  Today's exams show that you have had a miscarriage. This is the unplanned end of a pregnancy before 20 weeks. When a miscarriage happens, you’re likely to have a wide range of feelings.   Completed miscarriage means that the embryo or fetus, placenta, and other tissues are passed out of the uterus with bleeding.   It’s important to know that you did not cause this to happen. Miscarriage is very common. About 1 or 2 out of every 10 pregnancies end this way. Miscarriage usually takes place in the first 10 weeks after conception. It may happen before you know you are pregnant. It may happen for many reasons. Often the cause is not known.   Miscarriage is not your fault. It didn’t happen because you did something wrong. Sex or exercise does not cause a miscarriage. These activities are safe unless your healthcare provider tells you to stop. Even a minor fall won’t cause a miscarriage.   It appears that your miscarriage is complete. This means that all tissue from the pregnancy should have passed out of your uterus. If some of the pregnancy tissue is still in your uterus, you will have more cramping and bleeding. The bleeding can be light spotting or like a period. It's usually not heavy. You may also pass some tissue.   After you have recovered, you should be able to get pregnant again. Before trying, talk with your healthcare provider.   Home care  After you go home:  You may not feel well for a few days. Your body is going through changes. You will have mood swings.  You may have some cramping and bleeding, but it shouldn’t be severe.  When you are ready, you can start to go back to your normal routine.  Until the bleeding stops fully, to prevent infection:  Don’t have sex until your healthcare provider says it’s OK.  Don’t use tampons. Use pads instead.  Don’t use douche.  Having a miscarriage is stressful and upsetting. It's natural to feel sadness or grief. Partners grieve, too.  It may help to talk about your feelings with family, friends, a counselor, or .   Follow-up care  See your healthcare provider in 1 to 2 weeks for a checkup. If you had an ultrasound, a radiologist will look at it. You will be told of any results that may affect your care.   If you have cramping and bleeding for more than a few days, call your healthcare provider. You will need another exam. Your provider might need to take out the tissue with surgery. This is to prevent infection in your uterus. Or you may be given medicine to take at home. This will help the rest of the tissue come out of your body.   Call 911  Call 911 if you have any of these:   Severe pain and very heavy bleeding  Severe lightheadedness, passing out, or fainting  Fast heart rate  Trouble breathing  Confusion  Trouble waking up  When to get medical care  Call your healthcare provider right away if you have any of these:   Heavy bleeding that soaks 1 pad an hour over 3 hours  Bleeding that doesn’t stop after 10 days  Fluid from your vagina that smells bad  Fever of 100.4°F (38°C) or higher  Pain in your lower belly (abdomen) that gets worse  Weakness or dizziness  [employer] last reviewed this educational content on 12/1/2021  © 2941-6660 [employer]. All rights reserved. This information is not intended as a substitute for professional medical care. Always follow your healthcare professional's instructions.      Understanding Miscarriage: Recovery   Your body has had a shock to its system. Because of this, you may not feel well for a few days. Your body is going through changes, and you can expect mood swings. When you are ready, start back to your normal routine.   Mood swings   The miscarriage has caused a sudden drop in your hormone levels. This is likely to produce mood swings or make your emotions even more extreme. Stress and lack of sleep can also affect your moods. As your body returns to normal, these mood  swings should lessen.   Returning to your daily routines   You are the best  of how you feel. Do only as much as you feel up to. Also be sure to follow your healthcare provider’s directions. Keep the following in mind:   Return to work or your daily routines when you feel ready. This might be right away, or you may want to wait a few days.  Take showers instead of tub baths. This helps prevent infection. Your healthcare provider will tell you when you can take baths again.  Don't do strenuous exercise, such as aerobics or running, until the bleeding slows to the rate of a normal period.  Wait to have sex, and don’t use tampons until your healthcare provider says it's OK.  Do not douche.  Finding support   Recognize your need to talk. Ask for support when you want it. And accept help when it’s offered. Although sharing thoughts with your partner is vital, you may also feel like talking with other family members or friends.   Look nearby   The real experts on miscarriage are the women who have gone through it. Because miscarriage is so common, it’s likely that someone close to you has had one. You may begin to see that you’re not alone in experiencing such a loss.     Other sources of support   Many women find it easier to talk to people who are not family or friends. If this is true for you, try contacting the following:   Share: Pregnancy and Infant Loss Support at www.nationalshare.org  Resolve Through Sharing at www.bereavementservices.org  Pregnancy Loss Support Program at www.pregnancyloss.org  When to call the healthcare provider   It’s normal to be sad for a while. You may even feel sad until you’re pregnant again. Be sure to call your healthcare provider if either of the following occur:   You continue to have no interest in eating or are not able to sleep.  Your depression does not decrease. Or you get more upset.  [employer] last reviewed this educational content on 12/1/2022  © 2311-1541 [employer]. All rights reserved. This information is not intended as a substitute for professional medical care. Always follow your healthcare professional's instructions.

## 2025-01-12 NOTE — PROGRESS NOTES
Telephone call through answering service.  Patient had suction D&C today for 8-week missed  and is having some very slight discomfort in 1 leg.  It is fine when she is walking around but when she is lying down just very minimal discomfort.  No swelling or redness.  Discussed option of coming into emergency room tonight for ultrasound to rule out DVT, although risk would be quite low at this early of a gestational age.  If persistent or getting worse recommend to ER for evaluation.  Patient will observe overnight

## 2025-01-27 ENCOUNTER — OFFICE VISIT (OUTPATIENT)
Dept: OBGYN CLINIC | Facility: CLINIC | Age: 35
End: 2025-01-27
Payer: COMMERCIAL

## 2025-01-27 VITALS
WEIGHT: 140.38 LBS | SYSTOLIC BLOOD PRESSURE: 114 MMHG | DIASTOLIC BLOOD PRESSURE: 62 MMHG | HEART RATE: 63 BPM | HEIGHT: 65 IN | BODY MASS INDEX: 23.39 KG/M2

## 2025-01-27 DIAGNOSIS — Z98.890 STATUS POST D&C: ICD-10-CM

## 2025-01-27 DIAGNOSIS — Z09 POSTOPERATIVE EXAMINATION: Primary | ICD-10-CM

## 2025-01-27 NOTE — PROGRESS NOTES
[facility]  Obstetrics and Gynecology    Subjective:     Shannan Joel is a 34 year old  who presents for post-op visit 2 weeks s/p missed AB.  She is feeling well. She denies pain, just a little spotting/dark discharge remains.   Denies f/c, SOB, CP, N/V.     Objective:     Vitals:    25 1445   BP: 114/62   Pulse: 63   Weight: 140 lb 6.4 oz (63.7 kg)   Height: 65\"       Body mass index is 23.36 kg/m².    GEN: AAOx3, NAD, appears well, appears stated age  RESP: breathing comfortably  ABD: soft, NT, ND, no rebound or guarding  EXT: non tender, no edema  PELVIC:   Vulva: NEFG.   Vagina: Estrogenized, physiologic discharge. Good support.    Cervix: No lesions or tenderness.  Appears closed.    Labs:  Final Diagnosis:   Products of conception, removal:  -Immature chorionic villi and decidua, consistent with products of conception.         Assessment:     Shannan Joel is a 34 year old  s/p suction D&C for MAB, doing well.      Plan:     -- reviewed pathology results  -- encouraged continued rest/recovery, increase activity as tolerated  -- Expressed condolences and provided emotional support.  Reassured patient that while unfortunately pregnancy loss is very common, it is much less likely to experience multiple miscarriages. The majority of people will have a healthy pregnancy and baby following a miscarriage. The most common cause of first trimester miscarriage is sporadic chromosome abnormality and unlikely to recur.  Discussed with patient her plans to pursue another pregnancy. Continue PNV. Ok to TTC when she is emotionally ready.    -- Follow up as needed    France Braun MD  [facility]  2025 2:55 PM

## 2025-02-05 ENCOUNTER — PATIENT MESSAGE (OUTPATIENT)
Dept: OBGYN CLINIC | Facility: CLINIC | Age: 35
End: 2025-02-05

## 2025-02-05 DIAGNOSIS — Z98.890 STATUS POST D&C: Primary | ICD-10-CM

## 2025-02-06 NOTE — TELEPHONE ENCOUNTER
Tona Sanches, DO to Me  [facility] Nurse     2/6/25  1:55 PM  Single order for now for HCG. Can trend weekly moving forward if >5. Once HCG level is <5 can stop trending. Thanks!   No indicators present

## 2025-02-06 NOTE — TELEPHONE ENCOUNTER
Single order for HCG placed per verbal order from Dr. Sanches.  Spoke to patient.  Provider recommendations reviewed.  Patient verbalized understanding.

## 2025-02-06 NOTE — TELEPHONE ENCOUNTER
1/11/2025- DILATION AND CURETTAGE SUCTION   Patient took a home pregnancy test on 1/28/2025 and still shows positive results.    Hcg order pended and routed to provider for review.

## 2025-02-07 ENCOUNTER — TELEPHONE (OUTPATIENT)
Dept: OBGYN CLINIC | Facility: CLINIC | Age: 35
End: 2025-02-07

## 2025-02-07 NOTE — TELEPHONE ENCOUNTER
HCG lab ordered on 2/6/2025.    Spoke to patient  On 2/5/2025 second positive pregnancy test since D&C on 1/11/2025.  Believes menses returned today, is feeling usual symptoms typical of getting her period.  Has HCG lab scheduled today.  Discussed potential for menses to return even if testing positive for pregnancy 2 days ago, due to HCG levels trending downward as anticipated.  Gold droplet expressed from nipple also likely resulting from recent pregnancy and hormones.  Advised to complete HCG lab as scheduled.  Patient verbalized understanding.

## 2025-02-07 NOTE — TELEPHONE ENCOUNTER
Pt called stating she had a D&C on 01/11/25 and had a positive pregnancy test on 01/28. Pt stated this morning she noticed she was bleeding so is wondering if this is possible her cycle.     Pt also mentioned she had some discharged from her nipple and squeezed it and had a droplet (yellow) pt is just wondering if this is normal because of all the hormones/D&C.    Pt is wondering if she should still do her lab work. Please advise, thank you.

## 2025-02-08 LAB — HCG, TOTAL, QN: 12 MIU/ML

## 2025-02-10 NOTE — PROGRESS NOTES
Called and spoke with pt. And reviewed test results and recommendations from MD.  Pt. Confirms she got her period over the weekend...  Pt. Verbalizes understanding of information and plan..  Will take pregnancy test after her period as recommended.

## 2025-03-23 ENCOUNTER — OFFICE VISIT (OUTPATIENT)
Dept: FAMILY MEDICINE CLINIC | Facility: CLINIC | Age: 35
End: 2025-03-23
Payer: COMMERCIAL

## 2025-03-23 VITALS
RESPIRATION RATE: 16 BRPM | HEIGHT: 65 IN | OXYGEN SATURATION: 99 % | WEIGHT: 140 LBS | HEART RATE: 93 BPM | BODY MASS INDEX: 23.32 KG/M2 | TEMPERATURE: 99 F | SYSTOLIC BLOOD PRESSURE: 132 MMHG | DIASTOLIC BLOOD PRESSURE: 81 MMHG

## 2025-03-23 DIAGNOSIS — J35.8 APHTHOUS ULCER OF TONSIL: Primary | ICD-10-CM

## 2025-03-23 DIAGNOSIS — J02.8 VIRAL SORE THROAT: ICD-10-CM

## 2025-03-23 DIAGNOSIS — J02.9 SORE THROAT: ICD-10-CM

## 2025-03-23 DIAGNOSIS — B97.89 VIRAL SORE THROAT: ICD-10-CM

## 2025-03-23 LAB
CONTROL LINE PRESENT WITH A CLEAR BACKGROUND (YES/NO): YES YES/NO
KIT LOT #: NORMAL NUMERIC

## 2025-03-23 NOTE — PROGRESS NOTES
CHIEF COMPLAINT:     Chief Complaint   Patient presents with    Sore Throat     1 day, tmax 102, body aches, noticed white spots, OTC tylenol         HPI:   Shannan Joel is a 34 year old female presents to clinic with complaint of sore throat. Patient has had since 2 days ago.  Patient reports fever to 102, now resolved. Denies congestion, cough, headache, nausea, rash. Has no reent history of strep throat.  daughter is sick at home-- daughter had fever, now with small red dots on hands and around mouth.. .  Treating symptoms with otc pain relief.    Current Outpatient Medications   Medication Sig Dispense Refill    magnesium 250 MG Oral Tab Take 1 tablet (250 mg total) by mouth.      Prenatal Vit-Fe Fumarate-FA (O-ELSY PRENATAL) Oral Tab Take by mouth.       No current facility-administered medications for this visit.      Past Medical History:    Acute bronchitis    Acute pharyngitis    Candidiasis of mouth    Excessive or frequent menstruation    Headache(784.0)    Hx of motion sickness    Pap smear abnormality of cervix with LGSIL    LSIL    Pap smear for cervical cancer screening    wnl    Pap smear for cervical cancer screening    wnl    Papanicolaou smear of cervix with low grade squamous intraepithelial lesion (LGSIL)    Unspecified otitis media    Unspecified sinusitis (chronic)      Social History:  Social History     Socioeconomic History    Marital status:    Tobacco Use    Smoking status: Never    Smokeless tobacco: Never   Vaping Use    Vaping status: Never Used   Substance and Sexual Activity    Alcohol use: Not Currently     Comment: 1-2 weekly    Drug use: No    Sexual activity: Not Currently     Partners: Male   Other Topics Concern    Caffeine Concern Yes     Comment: 1-2 cups dalily    Exercise No     Comment: runs 2-3 x week    Seat Belt Yes     Social Drivers of Health      [facility], [facility]    Housing Stability         REVIEW OF SYSTEMS:   GENERAL HEALTH: feels well otherwise, normal appetite  SKIN: denies any unusual skin lesions or rashes  HEENT: denies ear pain, See HPI  RESPIRATORY: denies shortness of breath or wheezing  CARDIOVASCULAR: denies chest pain or palpitations   GI: denies vomiting or diarrhea  NEURO: denies dizziness or lightheadedness    EXAM:   /81   Pulse 93   Temp 98.7 °F (37.1 °C)   Resp 16   Ht 5' 5\" (1.651 m)   Wt 140 lb (63.5 kg)   LMP 03/03/2025 (Exact Date)   SpO2 99%   Breastfeeding No   BMI 23.30 kg/m²   GENERAL: well developed, well nourished,in no apparent distress  SKIN: no rashes,no suspicious lesions  HEAD: atraumatic, normocephalic  EYES: conjunctivae clear, EOM intact  EARS: TM's clear, non-injected, no bulging, retraction, or fluid bilaterally  NOSE: nostrils patent, no exudates, nasal mucosa pink and noninflamed  THROAT: oral mucosa pink, moist. Posterior pharynx not erythematous. Tonsils with several small to moderate sized aphthous ulcerations scant exudates. Tonsils 2/4.  Breath not malodorous   NECK: supple, non-tender  LUNGS: clear to auscultation bilaterally, no wheezes or rhonchi. Breathing is non labored.  CARDIO: RRR without murmur  EXTREMITIES: no cyanosis, clubbing or edema  LYMPH: No anterior cervical and submandibular lymphadenopathy.  No posterior cervical or occipital lymphadenopathy.    Recent Results (from the past 24 hours)   Strep A Assay W/Optic    Collection Time: 03/23/25  9:27 AM   Result Value Ref Range    Strep Grp A Screen neg Negative    Control Line Present with a clear background (yes/no) yes Yes/No    Kit Lot # [id number] Numeric    Kit Expiration Date 11/13/25 Date         ASSESSMENT AND PLAN:     Encounter Diagnoses   Name Primary?    Aphthous ulcer of tonsil Yes    Viral sore throat     Sore throat        Orders Placed This Encounter   Procedures    Strep A Assay W/Optic       Meds & Refills for this Visit:  Requested Prescriptions      No  prescriptions requested or ordered in this encounter       Imaging & Consults:  None.    Comfort measures explained and discussed as listed in Patient Instructions. Offered viscous lidocaine, but pt reports pain is manageable so far.     Follow up in 3-5 days if not improving, condition worsens, or fever greater than or equal to 100.4 persists for 72 hours.      Patient Instructions   Use otc pain relief as needed.   Gargle with warm saltwater for comfort and to help clean the area.   Avoid acidic or hard foods which may irritate the open areas.   Symptoms should improve within the week, but if they worsen or persist you should follow-up with your PCP for further evaluation.     The patient/parent indicates understanding of these issues and agrees to the plan.  The patient is asked to follow up with their PCP as needed.

## 2025-03-23 NOTE — PATIENT INSTRUCTIONS
Use otc pain relief as needed.   Gargle with warm saltwater for comfort and to help clean the area.   Avoid acidic or hard foods which may irritate the open areas.   Symptoms should improve within the week, but if they worsen or persist you should follow-up with your PCP for further evaluation.

## 2025-04-02 ENCOUNTER — TELEPHONE (OUTPATIENT)
Dept: OBGYN CLINIC | Facility: CLINIC | Age: 35
End: 2025-04-02

## 2025-04-02 DIAGNOSIS — N91.2 AMENORRHEA: ICD-10-CM

## 2025-04-02 DIAGNOSIS — O09.299 HISTORY OF MISCARRIAGE, CURRENTLY PREGNANT (HCC): Primary | ICD-10-CM

## 2025-04-02 NOTE — TELEPHONE ENCOUNTER
Patient calling to initiate prenatal care  LMP 3/3/2025  Patient is 7-8 weeks on 4/28  Confirmation of pregnancy appointment scheduled on   Future Appointments   Date Time Provider Department Center   5/2/2025  1:15 PM [facility] [address]   5/2/2025  2:00 PM Rom Sweeney MD [facility] [address]   5/30/2025  1:00 PM Rom Sweeney MD [facility] [address]       Insurance [facility]    Any history of ectopic pregnancy? no  Any history of miscarriage? Yes January 2025  Any medications that you are taking on a regular basis other than prenatal vitamins? no (if not taking prenatal vitamins, encourage patient to start taking.)  Any bleeding since the first day of last LMP and your positive pregnancy test? No    Pt is requesting to have blood work done before her scheduled  Appt, due to her recent miscarriage. Please advise

## 2025-04-02 NOTE — TELEPHONE ENCOUNTER
Established patient.  History of miscarriage.  Patient requests lab orders due to recent miscarriage.  Lab orders pended for provider review and signature.  [employer] message sent with prenatal information.

## 2025-04-07 ENCOUNTER — TELEPHONE (OUTPATIENT)
Dept: OBGYN CLINIC | Facility: CLINIC | Age: 35
End: 2025-04-07

## 2025-04-07 NOTE — TELEPHONE ENCOUNTER
Patient currently pregnant has not come in for  appt yet,   Future Appointments   Date Time Provider Department Center   5/2/2025  1:15 PM [facility] [address]   5/2/2025  2:00 PM Rom Sweeney MD [facility] [address]   5/30/2025  1:00 PM Rom Sweeney MD [facility] [address]      but had a miscarriage last year and would like to speak with nurse regarding obtaining HCG levels / blood work. Pls call via cell phone #.

## 2025-04-08 ENCOUNTER — TELEPHONE (OUTPATIENT)
Dept: OBGYN CLINIC | Facility: CLINIC | Age: 35
End: 2025-04-08

## 2025-04-08 NOTE — TELEPHONE ENCOUNTER
Pt called and asked if her lab orders could be faxed to [facility] Labs were sent to [facility] in error. I printed orders for HCG and progesterone and faxed to [facility] fax# [phone] (pt gave me the fax#). I rec'd fax confirmation.

## 2025-04-15 ENCOUNTER — TELEPHONE (OUTPATIENT)
Dept: OBGYN CLINIC | Facility: CLINIC | Age: 35
End: 2025-04-15

## 2025-04-15 ENCOUNTER — PATIENT MESSAGE (OUTPATIENT)
Dept: OBGYN CLINIC | Facility: CLINIC | Age: 35
End: 2025-04-15

## 2025-04-15 NOTE — TELEPHONE ENCOUNTER
[facility] labs received:    4/8/2024:  Progesterone 10.7  Hcg 2795    4/10/2025:  Hcg 4557    Lab results placed in Dr. Sweeney's bin for review.

## 2025-04-15 NOTE — TELEPHONE ENCOUNTER
6w1d per LMP    Reports mucousy-brown discharge x 2 today, after wiping.  Denies cramping or abdominal pain.  Completed progesterone and Hcg labs at [facility] on 4/8 and 4/10.  Patient has not received notification of results being available.  Instructed to call office to report increased bleeding, if blood transitions to bright red or onset of abdominal cramping that is worse than menstrual cramps.  Patient verbalized understanding.    [facility] [phone]  Spoke to Lisa-  Final lab results available and will be faxed to [facility] office.

## 2025-04-15 NOTE — TELEPHONE ENCOUNTER
Patient states when she wiped today she had a tiny amount of brownish discharge. She had a previous miscarriage so she is concerned.  She is also asking for lab resutls.  Please call to advise

## 2025-04-15 NOTE — TELEPHONE ENCOUNTER
Received test results from [facility]. Placed in [facility]'s bin in [place] for review.

## 2025-04-15 NOTE — TELEPHONE ENCOUNTER
Incoming fax from [facility] with lab results received-placed in provider's bin in [place].

## 2025-04-17 NOTE — TELEPHONE ENCOUNTER
Attempted to reach patient by phone but had to leave a voicemail.  [employer] message sent.

## 2025-05-02 ENCOUNTER — OFFICE VISIT (OUTPATIENT)
Dept: OBGYN CLINIC | Facility: CLINIC | Age: 35
End: 2025-05-02
Payer: COMMERCIAL

## 2025-05-02 ENCOUNTER — ULTRASOUND ENCOUNTER (OUTPATIENT)
Dept: OBGYN CLINIC | Facility: CLINIC | Age: 35
End: 2025-05-02
Payer: COMMERCIAL

## 2025-05-02 VITALS
DIASTOLIC BLOOD PRESSURE: 70 MMHG | HEIGHT: 65 IN | HEART RATE: 80 BPM | WEIGHT: 142.63 LBS | BODY MASS INDEX: 23.76 KG/M2 | SYSTOLIC BLOOD PRESSURE: 112 MMHG

## 2025-05-02 DIAGNOSIS — N91.1 SECONDARY AMENORRHEA: Primary | ICD-10-CM

## 2025-05-02 DIAGNOSIS — N91.2 AMENORRHEA: ICD-10-CM

## 2025-05-02 PROBLEM — O09.529 ANTEPARTUM MULTIGRAVIDA OF ADVANCED MATERNAL AGE (HCC): Status: ACTIVE | Noted: 2025-05-02

## 2025-05-02 PROCEDURE — 76856 US EXAM PELVIC COMPLETE: CPT | Performed by: OBSTETRICS & GYNECOLOGY

## 2025-05-02 PROCEDURE — 99214 OFFICE O/P EST MOD 30 MIN: CPT | Performed by: OBSTETRICS & GYNECOLOGY

## 2025-05-02 NOTE — PROGRESS NOTES
[facility]  Obstetrics and Gynecology    Subjective:     Shannan Joel is a 34 year old  female presents with c/o secondary amenorrhea and positive pregnancy test.     The patient was recommended to return for further evaluation. The patient reports doing well and had anxiety given her previous miscarriage.     Patient's last menstrual period was 2025 (exact date)..       Review of Systems:  General: no complaints per category. See HPI for additional information.   Breast: no complaints per category. See HPI for additional information.   Respiratory: no complaints per category. See HPI for additional information.   Cardiovascular: no complaints per category. See HPI for additional information.   GI: no complaints per category. See HPI for additional information.   : no complaints per category. See HPI for additional information.   Heme: no complaints per category. See HPI for additional information.     OB History:   OB History    Para Term  AB Living   3 1 1 0 1 1   SAB IAB Ectopic Multiple Live Births   1 0 0 0 1      # Outcome Date GA Lbr Marcelo/2nd Weight Sex Type Anes PTL Lv   3 Current            2 SAB 25 8w0d    SAB      1 Term 23 38w3d 08:30 6 lb 13 oz (3.09 kg) F NORMAL SPONT EPI N NICKI       Gyne History:  Menarche: 10  Period Cycle (Days): Pregnant  Period Duration (Days): N/A  Period Flow: N/A  Use of Birth Control (if yes, specify type): None  Hx Prior Abnormal Pap: Yes (8 years ago per pt)  Pap Date: 23  Pap Result Notes: WNL  Patient's last menstrual period was 2025 (exact date).      Meds:  Medications Ordered Prior to Encounter[1]    All:  Allergies[2]    PMH:  Past Medical History[3]    PSH:  Past Surgical History[4]    Objective:     Vitals:    25 1402   BP: 112/70   Pulse: 80   Weight: 142 lb 9.6 oz (64.7 kg)   Height: 65\"         Body mass index is 23.73 kg/m².    General: AAO.NAD.   CVS exam: normal peripheral  perfusion  Chest: non-labored breathing, no tachypnea   Abdominal exam: deferred   Pelvic exam: deferred     Imaging:  Ultrasound scan performed transabdominally.     LMP 3/3/2025   8w4d by LMP;  9w0d by ultrasound     Viable intrauterine pregnancy   Dates consistent with ultrasound.   Final JEAN CARLOS:  2025 by LMP consistent with US     Gestational sac with yolk sac and fetal pole with cardiac activity visualized.   bpm.   Cervix appears normal. Normal ovaries bilaterally. No free fluid visualized.            Assessment:     Shannan Joel is a 34 year old  female who presents for secondary amenorrhea and positive pregnancy test      Plan:     Patient Active Problem List    Diagnosis    Antepartum multigravida of advanced maternal age (HCC)     [ ] Level 2/MFM       Missed  (HCC)    Hymenal remnant     At 2-3 oclock, small attatched band  Pt was unaware  Monitor during labor      Familial benign hematuria     Work up with urology .         Secondary amenorrhea due to positive pregnancy test   - Ultrasound reviewed and discussed with patient, refer above  - Pt counseled on safety, diet, exercise, caffiene, tobacco, ETOH, sexual activity, ER precautions, diet, exercise, appropriate otc medication use, substance abuse   - Counseled on taking a PNV with 0.4mg of folic acid   - genetic screening testing d/w patient and family, pt declines invasive diagnostic testing and considering first versus second trimester screening   - Carrier screening, neural tube defect screening and hemoglobinopathy screening reviewed and discussed with patient; information provided and patient considering options and recommended to request desired screening at follow-up visit  - advised to follow up to establish prenatal care   - SAB precautions provided   - d/w nausea and vomiting in pregnancy including vitamin B6 and unisom   - COVID 19 precautions provided, recommend vaccination and booster if/when applicable    -Travel precautions provided, patient advised to not travel beyond 36 weeks as long as the pregnancy remains low risk.  Advised not to travel beyond 28 weeks for high risk pregnancy. Recommend compression socks, increase water intake and movement during traveling to prevent blood clots.    All of the findings and plan were discussed with the patient.  They note understanding and agreement with the plan of care.  All questions were answered to the best of my ability at this time.    Discussed with patient that there will not be further notification of normal or benign results other than receiving results on mychart. A [employer] message or telephone call will be placed by the physician and/or office staff if results are abnormal.       Total patient time was 30 minutes in evaluation, consultation, and coordination of care. This included face to face and non-face to face actions. The patient's questions and concerns that were addressed.     RTC in  4 weeks for NOB visit or sooner if needed     Rom Sweeney MD   [facility] - OBGYN      Note to patient and family   The 21st Century Cures Act makes medical notes available to patients in the interest of transparency.  However, please be advised that this is a medical document.  It is intended as pdry-jy-jrne communication.  It is written and medical language may contain abbreviations or verbiage that are technical and unfamiliar.  It may appear blunt or direct.  Medical documents are intended to carry relevant information, facts as evident, and the clinical opinion of the practitioner.          [1]   Current Outpatient Medications on File Prior to Visit   Medication Sig Dispense Refill    magnesium 250 MG Oral Tab Take 1 tablet (250 mg total) by mouth.      Prenatal Vit-Fe Fumarate-FA (O-ELSY PRENATAL) Oral Tab Take by mouth.       No current facility-administered medications on file prior to visit.   [2]   Allergies  Allergen Reactions    Levaquin NAUSEA ONLY     TABS   [3]    Past Medical History:   Acute bronchitis    Acute pharyngitis    Candidiasis of mouth    Excessive or frequent menstruation    Headache(784.0)    Hx of motion sickness    Pap smear abnormality of cervix with LGSIL    LSIL    Pap smear for cervical cancer screening    wnl    Pap smear for cervical cancer screening    wnl    Papanicolaou smear of cervix with low grade squamous intraepithelial lesion (LGSIL)    Unspecified otitis media    Unspecified sinusitis (chronic)   [4]   Past Surgical History:  Procedure Laterality Date    Colposcopy, cervix w/upper adjacent vagina; w/biopsy(s), cervix  10-30-14    no bx

## 2025-05-30 ENCOUNTER — INITIAL PRENATAL (OUTPATIENT)
Dept: OBGYN CLINIC | Facility: CLINIC | Age: 35
End: 2025-05-30
Payer: COMMERCIAL

## 2025-05-30 VITALS
HEIGHT: 65 IN | SYSTOLIC BLOOD PRESSURE: 110 MMHG | HEART RATE: 79 BPM | WEIGHT: 144 LBS | BODY MASS INDEX: 23.99 KG/M2 | DIASTOLIC BLOOD PRESSURE: 68 MMHG

## 2025-05-30 DIAGNOSIS — O09.529 ANTEPARTUM MULTIGRAVIDA OF ADVANCED MATERNAL AGE (HCC): Primary | ICD-10-CM

## 2025-05-30 DIAGNOSIS — Z11.3 SCREENING EXAMINATION FOR STD (SEXUALLY TRANSMITTED DISEASE): ICD-10-CM

## 2025-05-30 PROCEDURE — 87491 CHLMYD TRACH DNA AMP PROBE: CPT | Performed by: OBSTETRICS & GYNECOLOGY

## 2025-05-30 PROCEDURE — 87077 CULTURE AEROBIC IDENTIFY: CPT | Performed by: OBSTETRICS & GYNECOLOGY

## 2025-05-30 PROCEDURE — 87624 HPV HI-RISK TYP POOLED RSLT: CPT | Performed by: OBSTETRICS & GYNECOLOGY

## 2025-05-30 PROCEDURE — 88175 CYTOPATH C/V AUTO FLUID REDO: CPT | Performed by: OBSTETRICS & GYNECOLOGY

## 2025-05-30 PROCEDURE — 87801 DETECT AGNT MULT DNA AMPLI: CPT | Performed by: OBSTETRICS & GYNECOLOGY

## 2025-05-30 PROCEDURE — 87086 URINE CULTURE/COLONY COUNT: CPT | Performed by: OBSTETRICS & GYNECOLOGY

## 2025-05-30 PROCEDURE — 87591 N.GONORRHOEAE DNA AMP PROB: CPT | Performed by: OBSTETRICS & GYNECOLOGY

## 2025-05-30 NOTE — PROGRESS NOTES
[facility]  Obstetrics and Gynecology  History & Physical    The following individual(s) verbally consented to be recorded using ambient AI listening technology and understand that they can each withdraw their consent to this listening technology at any point by asking the clinician to turn off or pause the recording:    Patient name: Shannan Joel    CC: Patient is here to establish prenatal care     Subjective:     HPI:  Shannan Joel is a 34 year old  female at 12w4d who presents today to establish prenatal care.     History of Present Illness  Shannan Joel is a 34 year old female who presents for a prenatal visit at 13 weeks of pregnancy.    She is currently 13 weeks pregnant and has transitioned from the first to the second trimester. Anxiety from a previous miscarriage has lessened, but she remains eager to hear the baby's heartbeat for reassurance. She hopes for an improvement in persistent nausea.    She experiences abdominal discomfort, described as a 'tightness' rather than cramping, which she also felt during her second pregnancy. She suspects it might be related to food sensitivity, particularly to gluten or excessive carbohydrates, which she has been craving due to nausea. The discomfort sometimes affects her ability to sit comfortably at night.    She reports right-sided hip and buttock pain, similar to sciatica, which she did not experience this early in her previous pregnancies. Her , a physical therapist, has been consulted about this issue.    She has noticed red lines on her stomach, resembling stretch marks, which come and go and are more pronounced after a hot shower. These lines are not itchy, and she is unsure if they are related to her recent hormonal changes following a miscarriage and subsequent pregnancy.    She has concerns about dental work during pregnancy, as her dentist recommended addressing some spots that could develop into cavities. She  is cautious about undergoing dental procedures while pregnant and is considering waiting until later in her pregnancy.    No breast pain or tenderness, though she notes soreness attributed to pregnancy. No nipple discharge currently, though she experienced it after her miscarriage. No abnormal vaginal discharge.    LMP: Patient's last menstrual period was 2025 (exact date).  JEAN CARLOS:  Estimated Date of Delivery: None noted.    OB:  OB History    Para Term  AB Living   3 1 1 0 1 1   SAB IAB Ectopic Multiple Live Births   1 0 0 0 1      # Outcome Date GA Lbr Marcelo/2nd Weight Sex Type Anes PTL Lv   3 Current            2 SAB 25 8w0d    SAB      1 Term 23 38w3d 08:30 6 lb 13 oz (3.09 kg) F NORMAL SPONT EPI N NICKI         GYN:        STD hx- denies.   Pap hx- 2023 - NILM  HPV neg     PMH: Past Medical History[1]    PSH:  Past Surgical History[2]    MEDS:  Medications Ordered Prior to Encounter[3]    Allergies:    Allergies[4]    Immunizations:  Immunization History   Administered Date(s) Administered    Covid-19 Vaccine Moderna 100 mcg/0.5 ml 2021, 2021    DTP 1988, 1989, 1990, 1994    HEP B 1998, 1999, 1999    HIB 1990    MMR 1990, 1999    Measles 08/10/1989    Meningococcal-Menactra 2009    OPV 1988, 1989, 1990, 1994    RHO(D) Immune Globulin 2022, 2023, 2025    Rubella 08/10/1989    TD 2003    TDAP 2022       Family Hx:    Family History[5]    SocialHx:      Short Social Hx on File[6]      Patient feels unsafe or threatened?: No       Health maintenance:  Mammogram (age 40 and q1-2yr):  N/A  Colonoscopy (age 50 and q10yr):  N/A    Review of Systems:  General: no complaints per category. See HPI for additional information.   Breast: no complaints per category. See HPI for additional information.   Respiratory: no complaints per category. See HPI for additional  information.   Cardiovascular: no complaints per category. See HPI for additional information.   GI: no complaints per category. See HPI for additional information.   : no complaints per category. See HPI for additional information.   Heme: no complaints per category. See HPI for additional information.     Objective:     PE:  Vitals: /68   Pulse 79   Ht 65\"   Wt 144 lb (65.3 kg)   LMP 2025 (Exact Date)   BMI 23.96 kg/m²   Gen: A/O, NAD  Head/Neck: neck supple, thyroid non-enlarged, symmetric and without nodules  Breasts: breasts symmetric, no dominant or suspicious mass, no skin or nipple changes and no axillary adenopathy  CV: normal peripheral perfusion   Lungs: no tachypnea, non-labored breathing   Abdominal exam: soft, nontender, nondistended  Ext: non-tender, no edema  :  1) External Genitalia -  Normal appearing skin and hair distribution, no visible lesions. Visible anterior hymenal remnant (2:00-3:00), non-tender to palpation and examination.   2) Vagina:  mucosa appears pink, and well rugated.   3) Cervix:  smooth, with no visible lesions, erosions, or scars, os closed. No bleeding noted.   4) Uterus: Anteverted. Mobile. 12 week gestational size.   5) Adnexa/parametria:  Non tender adnexa. No masses.     Fetal Well Being Assessment:     BPM    Assessment/Plan:     Shannan Joel is a 34 year old  female at 12w4d who presents today to establish prenatal care.    Patient Active Problem List    Diagnosis    Antepartum multigravida of advanced maternal age (HCC)     [ ] Level 2/MFM       Missed  (HCC)    Hymenal remnant     At 2-3 oclock, small attatched band  Pt was unaware  Monitor during labor      Familial benign hematuria     Work up with urology .       Assessment & Plan  Antepartum multigravida of advanced maternal age  Currently in the second trimester with anxiety related to a previous miscarriage, reassured by fetal heartbeat. Discussed genetic  testing options due to advanced maternal age, including NIPT for chromosomal abnormalities and quad screen for neural tube defects. NIPT is recommended for all patients, especially those of advanced maternal age, as it screens for chromosomal abnormalities such as Down syndrome. High-risk results necessitate diagnostic tests like amniocentesis. Emphasized the importance of dental health during pregnancy and the potential impact of untreated cavities on calcium levels, as the fetus may draw calcium from her teeth if not adequately supported.  - Order NIPT for chromosomal abnormalities.  - Discuss the option of a quad screen for neural tube defects at 16-20 weeks.  - Provide a letter to the dentist regarding safe dental procedures during pregnancy.  - Advise to avoid x-rays until later in the second trimester if possible.    Abdominal discomfort during pregnancy  Intermittent abdominal discomfort described as tightness and bloating, possibly related to dietary triggers such as gluten and carbohydrates. No specific cramping reported. Advised to monitor dietary intake and eliminate potential triggers. Carbohydrates can help with nausea but may contribute to discomfort if there is a sensitivity.  - Advise dietary modification to eliminate potential triggers such as gluten and carbohydrates.    Sciatica during pregnancy  Experiencing right-sided hip and buttock pain suggestive of early-onset sciatica. Discussed non-pharmacological management options including stretching, use of a Theragun, and rolling with a tennis or lacrosse ball. Referral to physical therapy is an option if symptoms worsen. Her , a physical therapist, can assist with management at home.  - Recommend stretching exercises and use of a Theragun, avoiding the lower back and abdomen.  - Advise rolling with a tennis or lacrosse ball.  - Consider referral to physical therapy if symptoms worsen.    Hymenal remnant  Presence of a hymenal remnant since  the last delivery, not causing obstruction or pain. Discussed the anatomical nature of the hymen and reassured that it is not a defect or cause for concern.  - No intervention required as it is not causing any issues.    Prenatal care  - prenatal labs ordered  - Pap: UTD   - Cervical cultures: GC, Chl collected and ordered   - continue PNV daily given     Genetic Screening tests  - Discussion held with patient about genetic screening. Discussion including first trimester versus second trimester screening. Patient offered Amniocentesis and declined. Pt considering first or second trimester screening. Advised to call the office if she would like to pursue first trimester screening as recommended to complete at 13wks GA  - Cystic fibrosis/SMA/hemoglobinopathy carrier screening: offered and patient will consider, advised to notify office if she desires order     Patient education  - Pt counseled on safety, diet, exercise, caffiene, tobacco, ETOH, sexual activity, ER precautions, diet, exercise, appropriate weight gain, travel, appropriate otc medication use, substance abuse and pets.  - Counseled on taking a PNV with 0.4mg of folic acid   - Limiting intake of alcohol, coffee, tea, soda, and other foods containing caffeine to 200 mg every day   - Limit intake of fish to twice weekly to limit mercury exposure  - COVID 19 precautions provided    - Pregnancy BMI guidelines:    <18.5 = underweight = 28-40 lbs gain = 1 lb/wk (2nd/3rd tri)   18.5 - 24.9 = normal = 25 - 35 lbs gain = 1 lb/wk   25 - 29.9 = overweight = 15 - 25 lbs gain = 0.6 lb/wk   > 30 = obese = 11 - 20 lbs gain = 0.5 lb/wk       RTC in 4 wks     Rom Sweeney MD   [facility] - OBGYN      Note to patient and family   The 21st Century Cures Act makes medical notes available to patients in the interest of transparency.  However, please be advised that this is a medical document.  It is intended as gmei-qv-wtfi communication.  It is written and medical language may  contain abbreviations or verbiage that are technical and unfamiliar.  It may appear blunt or direct.  Medical documents are intended to carry relevant information, facts as evident, and the clinical opinion of the practitioner.                        [1]   Past Medical History:   Acute bronchitis    Acute pharyngitis    Candidiasis of mouth    Excessive or frequent menstruation    Headache(784.0)    Hx of motion sickness    Pap smear abnormality of cervix with LGSIL    LSIL    Pap smear for cervical cancer screening    wnl    Pap smear for cervical cancer screening    wnl    Papanicolaou smear of cervix with low grade squamous intraepithelial lesion (LGSIL)    Unspecified otitis media    Unspecified sinusitis (chronic)   [2]   Past Surgical History:  Procedure Laterality Date    Colposcopy, cervix w/upper adjacent vagina; w/biopsy(s), cervix  10/30/2014    no bx      23   [3]   Current Outpatient Medications on File Prior to Visit   Medication Sig Dispense Refill    magnesium 250 MG Oral Tab Take 1 tablet (250 mg total) by mouth.      Prenatal Vit-Fe Fumarate-FA (O-ELSY PRENATAL) Oral Tab Take by mouth.       No current facility-administered medications on file prior to visit.   [4]   Allergies  Allergen Reactions    Levaquin NAUSEA ONLY     TABS   [5]   Family History  Problem Relation Age of Onset    Hypertension Father     Lipids Father     Heart Disorder Maternal Grandmother     Hypertension Paternal Grandfather    [6]   Social History  Socioeconomic History    Marital status:    Tobacco Use    Smoking status: Never    Smokeless tobacco: Never   Vaping Use    Vaping status: Never Used   Substance and Sexual Activity    Alcohol use: Not Currently     Comment: 1-2 weekly    Drug use: No    Sexual activity: Yes     Partners: Male   Other Topics Concern    Caffeine Concern Yes     Comment: 1-2 cups dalily    Exercise No     Comment: runs 2-3 x week    Seat Belt Yes     Social Drivers of Health      Food Insecurity: No Food Insecurity (5/28/2025)    NCSS - Food Insecurity     Worried About Running Out of Food in the Last Year: No     Ran Out of Food in the Last Year: No   Transportation Needs: No Transportation Needs (5/28/2025)    NCSS - Transportation     Lack of Transportation: No   Stress: No Stress Concern Present (5/28/2025)    Stress     Feeling of Stress : No   Housing Stability: Not At Risk (5/28/2025)    NCSS - Housing/Utilities     Has Housing: Yes     Worried About Losing Housing: No     Unable to Get Utilities: No

## 2025-05-30 NOTE — PATIENT INSTRUCTIONS
VISIT SUMMARY:    Today, you had a prenatal visit at 12w4d weeks of pregnancy. We discussed your transition to the second trimester, addressed your concerns about abdominal discomfort, sciatica, and dental work during pregnancy, and reassured you about the presence of a hymenal remnant.    YOUR PLAN:    ANTEPARTUM MULTIGRAVIDA OF ADVANCED MATERNAL AGE: You are currently in your second trimester and have some anxiety due to a previous miscarriage. We discussed genetic testing options and the importance of dental health during pregnancy.  -Order NIPT for chromosomal abnormalities.  -Discuss the option of a quad screen for neural tube defects at 16-20 weeks.  -Provide a letter to your dentist regarding safe dental procedures during pregnancy.  -Avoid x-rays until later in the second trimester if possible.    ABDOMINAL DISCOMFORT DURING PREGNANCY: You are experiencing intermittent abdominal discomfort, possibly related to dietary triggers such as gluten and carbohydrates.  -Monitor your dietary intake and try to eliminate potential triggers such as gluten and carbohydrates.    SCIATICA DURING PREGNANCY: You have right-sided hip and buttock pain suggestive of early-onset sciatica.  -Perform stretching exercises and use a Theragun, avoiding the lower back and abdomen.  -Roll with a tennis or lacrosse ball.  -Consider referral to physical therapy if symptoms worsen.    HYMENAL REMNANT: You have a hymenal remnant from your last delivery, which is not causing any issues.  -No intervention is required as it is not causing any issues.

## 2025-06-02 ENCOUNTER — TELEPHONE (OUTPATIENT)
Dept: OBGYN CLINIC | Facility: CLINIC | Age: 35
End: 2025-06-02

## 2025-06-02 LAB
C TRACH DNA SPEC QL NAA+PROBE: NEGATIVE
HPV E6+E7 MRNA CVX QL NAA+PROBE: NEGATIVE
N GONORRHOEA DNA SPEC QL NAA+PROBE: NEGATIVE

## 2025-06-02 NOTE — TELEPHONE ENCOUNTER
Estimated Date of Delivery: 12/8/25 - currently 13w0d    Blood type: A-  Patient desires NIPT testing- would like gender reported.  Order placed for Panorama.  Patient aware that [facility] will send her a link by text or email to schedule mobile phlebotomy and ship a kit to her home.  Advised not to look at results on [facility] portal or in [employer] if she is not ready to see the gender.  Reminded patient to complete additional lab orders at any [place] lab location.  Patient verbalized understanding.         Order Number: [id number]

## 2025-06-02 NOTE — TELEPHONE ENCOUNTER
Estimated Date of Delivery: 12/8/25 - currently 13w0d   Voicemail left for patient to return our call. Office phone number provided.

## 2025-06-02 NOTE — TELEPHONE ENCOUNTER
Pt called to speak with nurse re information on DNA testing currently pregnant 16wk. Call via cell phone #

## 2025-06-03 ENCOUNTER — TELEPHONE (OUTPATIENT)
Dept: OBGYN CLINIC | Facility: CLINIC | Age: 35
End: 2025-06-03

## 2025-06-03 NOTE — TELEPHONE ENCOUNTER
----- Message from Rom Sweeney sent at 5/30/2025  1:45 PM CDT -----  Regarding: Ab Testing Order  This patient desires the following Ab testing: Panorama  - test ordered, please provide instructions on scheduling.

## 2025-06-04 LAB
ABSOLUTE BASOPHILS: 39 CELLS/UL (ref 0–200)
ABSOLUTE EOSINOPHILS: 127 CELLS/UL (ref 15–500)
ABSOLUTE LYMPHOCYTES: 2244 CELLS/UL (ref 850–3900)
ABSOLUTE MONOCYTES: 725 CELLS/UL (ref 200–950)
ABSOLUTE NEUTROPHILS: 6664 CELLS/UL (ref 1500–7800)
BASOPHILS: 0.4 %
EOSINOPHILS: 1.3 %
HEMATOCRIT: 35.3 % (ref 35–45)
HEMOGLOBIN: 11.7 G/DL (ref 11.7–15.5)
LYMPHOCYTES: 22.9 %
MCH: 30.2 PG (ref 27–33)
MCHC: 33.1 G/DL (ref 32–36)
MCV: 91 FL (ref 80–100)
MONOCYTES: 7.4 %
MPV: 9.5 FL (ref 7.5–12.5)
NEUTROPHILS: 68 %
PLATELET COUNT: 220 THOUSAND/UL (ref 140–400)
RDW: 12.8 % (ref 11–15)
RED BLOOD CELL COUNT: 3.88 MILLION/UL (ref 3.8–5.1)
RUBELLA ANTIBODY (IGG): 8.21 INDEX
WHITE BLOOD CELL COUNT: 9.8 THOUSAND/UL (ref 3.8–10.8)

## 2025-06-05 LAB
MEASLES AB (IGG), DIAGNOSTIC: 109 AU/ML
VARICELLA ZOSTER VIRUS$AB (IGG): 9.46 S/CO

## 2025-06-27 ENCOUNTER — ROUTINE PRENATAL (OUTPATIENT)
Dept: OBGYN CLINIC | Facility: CLINIC | Age: 35
End: 2025-06-27
Payer: COMMERCIAL

## 2025-06-27 VITALS
BODY MASS INDEX: 23.99 KG/M2 | HEART RATE: 74 BPM | WEIGHT: 144 LBS | SYSTOLIC BLOOD PRESSURE: 100 MMHG | HEIGHT: 65 IN | DIASTOLIC BLOOD PRESSURE: 64 MMHG

## 2025-06-27 DIAGNOSIS — O09.529 ANTEPARTUM MULTIGRAVIDA OF ADVANCED MATERNAL AGE (HCC): ICD-10-CM

## 2025-06-27 DIAGNOSIS — N02.9: ICD-10-CM

## 2025-06-27 DIAGNOSIS — Z34.82 ENCOUNTER FOR SUPERVISION OF OTHER NORMAL PREGNANCY IN SECOND TRIMESTER (HCC): Primary | ICD-10-CM

## 2025-06-27 DIAGNOSIS — O26.899 RH NEGATIVE STATE IN ANTEPARTUM PERIOD (HCC): ICD-10-CM

## 2025-06-27 DIAGNOSIS — N89.8 HYMENAL REMNANT: ICD-10-CM

## 2025-06-27 DIAGNOSIS — Z67.91 RH NEGATIVE STATE IN ANTEPARTUM PERIOD (HCC): ICD-10-CM

## 2025-06-27 PROBLEM — O02.1 MISSED ABORTION (HCC): Status: RESOLVED | Noted: 2025-01-11 | Resolved: 2025-06-27

## 2025-06-27 PROBLEM — Z34.90 SUPERVISION OF NORMAL PREGNANCY (HCC): Status: ACTIVE | Noted: 2025-06-27

## 2025-06-27 NOTE — PROGRESS NOTES
MAITE    GA 16w4d  Vitals:    25 0920   BP: 100/64   Pulse: 74   Weight: 144 lb (65.3 kg)   Height: 65\"       Doing well. No complaints.   Denies abdominal/pelvic pain or vaginal bleeding.   Rh negative   Genetic screening low risk cfDNA, it's a boy   Recommend Anatomy scan 20 wks --> Level II with MFM  Prenatal labs reviewed and wnl    Pt c/o HA but believes them to be due to teeth grinding, she usually wears  but needs new one fitted so hasn't been wearing one for past week leading to more HA. Denies other PIH symptoms.      Assessment:     Shannan Joel is a 34 year old  at 16w4d who presents for routine OB visit. Overall doing well.     Problem List Items Addressed This Visit       Familial benign hematuria    Hymenal remnant    Rh negative state in antepartum period ([facility])    Antepartum multigravida of advanced maternal age ([facility])    Supervision of normal pregnancy ([facility]) - Primary           Plan:     Patient Active Problem List    Diagnosis    Supervision of normal pregnancy ([facility])    Antepartum multigravida of advanced maternal age ([facility])     [ ] Level 2/MFM       Rh negative state in antepartum period ([facility])     [ ] Rhogam at 28 weeks      Hymenal remnant     At 2-3 oclock, small attatched band  Pt was unaware  Monitor during labor      Familial benign hematuria     Work up with urology .         - continue routine prenatal care   - labor and rupture of membrane precautions provided    Return to clinic in 4 weeks for MAITE visit       Tona Sanches,    [facility] - OBGYN      Note to patient and family   The 21st Century Cures Act makes medical notes available to patients in the interest of transparency.  However, please be advised that this is a medical document.  It is intended as [id number]-wn-osgm communication.  It is written and medical language may contain abbreviations or verbiage that are technical and unfamiliar.  It may appear blunt or direct.  Medical documents are  intended to carry relevant information, facts as evident, and the clinical opinion of the practitioner.

## 2025-06-27 NOTE — PATIENT INSTRUCTIONS
General Instructions for Common Concerns in Pregnancy    The following instructions are recommended by the OB/GYNE Department at [facility]. If symptoms persist for more than 2-3 days, please contact the office for further assistance at [phone].    Symptoms of a cold: sneezing, coughing, headache, runny nose, watery eyes, slightly elevated temperature (under 100). A temperature over 100, with a productive cough (yellow-green mucus), needs evaluation by your primary care provider. There is no cure for the cold/flu... it takes time!  Rest Chicken soup Increase fluid intake   Cepacol, Sucrets lozenges, or Halls (no Zinc) Robitussin cough syrup, any kind Sudafed       Heartburn or Upset Stomach: *Liquid antacids are frequently more effective than tablets. Call the office if no relief. Avoid spicy food or any food that may cause symptoms.  Maalox or Maalox Plus Pepcid Tums   Mylanta ll, sodium free Zantac 75mg twice daily Tagamet   Milk of Magnesia          Headache or Mild aches and pains associated with colds or flu: Call office if persistent or severe.   Rest Tylenol or Extra Strength Tylenol   (Acetaminophen)                                                           Diarrhea(simple): Call office if persists more than 2 days or if accompanied by a fever.  Contact the office if you have been in a foreign country, if other family members have been ill, or if there is a possibility of contact with contaminated food/water prior to onset.  Minimum residue diet: No milk, fruits or vegetables other than peeled, cooked potatoes. Avoid spicy or greasy foods. You can have liquids, bread, noodles, rice, plain pasta, and tender cooked meat. Fish and poultry may be eaten as desired.  Kaopectate  Immodium A-D Lomotil       Constipation (with no nausea or vomiting): Increase fluids, fiber, and activity. Call with severe straining.  Milk of Magnesia Colace 50 mg 1-2 caps daily Prune Juice   Metamucil/Benefiber          Hemorrhoids: Call if severe bleeding or pain  Preparation H Anusol Suppositories Tucks           OVER THE COUNTER MEDICATION USE IN PREGNANCY    NO ASPIRIN, IBUPROFIN (ADVIL, MOTRIN, OR ALEVE), OR ZINC    The following over the counter (OTC) medications may be safely taken by pregnant women following all the directions on the package for adult usage.    Cold, flu, and allergy relief: Nasal congestion, cough, sneezing, runny nose, minor aches and pains, scratchy/sore throat  Benadryl Class B   Benadryl Cold or Allergy Sinus Class C   Claritin, Claritin D Class B   Chlor-trimeton Allergy Class B   Chlor-trimeton Allergy-sinus Class C   Comtrex Allergy Sinus/Multi Symptom Cough Class C   Contact (Severe cold and flu, Decongestant, Antihistamine, Day and Night Cold/Flu) Class C   Ocean Nasal Spray/Mist Class B   Robitussin CF, DM, PE/Robitussin Cough and Cold/Robitussin Cough, Cold, and Flu Class C   Sinutab Sinus Allergy Class C   Sudafed/Tavist- D Class C   Theraflu Cold and Cough/Flu and Cold Class C   Tylenol Regular and Extra strength Class B   Tylenol Cold, Multi-symptom, Cold Nighttime Class C   Vicks Formula 44, 44D, and 44E Class C     Gas:  Gas X, Mylicon Drops, Mylanta Gas Class C     Motion Sickness:  Dramamine/Dramamine ll Class B     Yeast Symptoms:  Monistat 7 Class B     Insomnia:  Sominex Class B                     Morning sickness prevention and remedies:  Eat a piece of toast or a few crackers before you get out of bed in the morning ( place them by your bed the night before), or when you feel nauseated.   Get out of bed slowly and avoid sudden movements.   Eat smaller, more frequent meals during the day so your stomach does not remain empty for very long.   Eat high protein foods: eggs, cheese, nuts, meats,  ect, as well as fruits/fruit juices. These foods help prevent low levels of sugar in your blood, which can also cause nausea.   Sip spearmint, peppermint, gt, or raspberry leaf tea.  Ginger ale, 7 up , or Sprite   Vitamin B6 25 mg/day to start; if not helpful, increase by 25 mg/day until taking 100 mg/day maximum.   Medication and Mother's Milk, 18th ED, 2019 and Physician's Desk Reference, 71st Ed, 2017

## 2025-07-10 ENCOUNTER — PATIENT MESSAGE (OUTPATIENT)
Dept: OBGYN CLINIC | Facility: CLINIC | Age: 35
End: 2025-07-10

## 2025-07-18 NOTE — PROGRESS NOTES
Outpatient Maternal-Fetal Medicine Consultation    Dear Dr. Sweeney    Thank you for requesting ultrasound evaluation and maternal fetal medicine consultation on your patient Shannan Joel.  As you are aware she is a 34 year old female  with a singletonpregnancy and an Estimated Date of Delivery: 25.  A maternal-fetal medicine consultation was requested secondary to advanced maternal age.  Her prenatal records and labs were reviewed.    HISTORY  # 1 - Date: 23, Sex: Female, Weight: 6 lb 13 oz (3.09 kg), GA: 38w3d, Type: Normal spontaneous vaginal delivery, Apgar1: 9, Apgar5: 9, Living: Living, Birth Comments: None    # 2 - Date: 25, Sex: None, Weight: None, GA: 8w0d, Type: Spontaneous , Apgar1: None, Apgar5: None, Living: None, Birth Comments: None    # 3 - Date: None, Sex: None, Weight: None, GA: None, Type: None, Apgar1: None, Apgar5: None, Living: None, Birth Comments: None      Past Medical History  The patient  has a past medical history of Acute bronchitis, Acute pharyngitis, Candidiasis of mouth, Excessive or frequent menstruation, Headache(784.0), motion sickness, Pap smear abnormality of cervix with LGSIL (10/17/2014), Pap smear for cervical cancer screening (11, 13, 10-18-13), Pap smear for cervical cancer screening (10/29/2015), Papanicolaou smear of cervix with low grade squamous intraepithelial lesion (LGSIL) (2018), Unspecified otitis media, and Unspecified sinusitis (chronic).    She has no past medical history of Anesthesia complication, Diabetes (HCC), Difficult intubation, Family history of malignant hyperthermia, Family history of pseudocholinesterase deficiency, History of adverse reaction to anesthesia, Malignant hyperthermia, PONV (postoperative nausea and vomiting), Pseudocholinesterase deficiency, or Type 1 diabetes mellitus (HCC).    Past Surgical History  The patient  has a past surgical history that includes colposcopy, cervix w/upper  adjacent vagina; w/biopsy(s), cervix (10/30/2014) and  (23).    Family History  The patient She indicated that her mother is alive. She indicated that her father is alive. She indicated that her maternal grandmother is alive. She indicated that her maternal grandfather is alive. She indicated that her paternal grandmother is alive. She indicated that her paternal grandfather is alive.      Medications:   Current Outpatient Medications:     magnesium 250 MG Oral Tab, Take 1 tablet (250 mg total) by mouth., Disp: , Rfl:     Prenatal Vit-Fe Fumarate-FA (O-ELSY PRENATAL) Oral Tab, Take by mouth., Disp: , Rfl:   Allergies:   Allergies   Allergen Reactions    Levaquin NAUSEA ONLY     TABS       PHYSICAL EXAMINATION:  /67 (BP Location: Right arm, Patient Position: Sitting, Cuff Size: adult)   Pulse 82   Ht 5' 5\" (1.651 m)   Wt 153 lb (69.4 kg)   LMP 2025 (Exact Date)   BMI 25.46 kg/m²   General: alert and oriented,no acute distress  Abdomen: gravid, soft, non-tender  Extremities: non-tender, no edema    OBSTETRIC ULTRASOUND  The patient had a level II ultrasound today which revealed size consistent with dates and a normal detailed anatomic survey.      Ultrasound findings:   Single IUP in cephalic presentation.    Placenta is posterior.   A 3 vessel cord is noted.  Insertion site: normal insertion  Cardiac activity is present at 146 bpm   g ( 0 lb 15 oz);   MVP is 3.7 cm  Cervix  measured  45.5 mm transabdominally   Uterus and adnexa appeared normal today on ultrasound    The fetal measurements are consistent with the established JEAN CARLOS. No ultrasound evidence of structural abnormalities are seen today. The nasal bone is present. No ultrasound evidence of markers for aneuploidy are seen. She understands that ultrasound exam cannot exclude genetic abnormalities and that genetic testing is recommended. The limitations of ultrasound were discussed.    See PACS/Imaging Tab For Complete Ultrasound  Report  I interpreted the results and reviewed them with the patient.    DISCUSSION  During her visit we discussed and reviewed the following issues:  ADVANCED MATERNAL AGE    Background  I reviewed with the patient that pregnancies in women of advanced maternal age (35 or older at delivery) are associated with elevated risks.  Specifically, there is a higher rate of:    Fetal malformations  Preeclampsia  Gestational diabetes  Intrauterine fetal death    As a result, enhanced pregnancy surveillance is advised for these patients including a comprehensive ultrasound to assess for fetal malformations  (at 20 weeks) and a third trimester ultrasound assessment for fetal growth (at 32 weeks).  In addition, weekly NST's (initiating at 36 weeks gestation for women 35-39 years and at 32 weeks gestation for women 40 years and older) are also advised.  Routine obstetric care is more than adequate to assess for gestational diabetes and preeclampsia; hence, no further significant alterations in obstetric care are advised.    Fetal Aneuploidy    We also discussed the increased risk of chromosomal abnormalities associated with advanced maternal age.  I reviewed that an ultrasound examination cannot reliably exclude potential genetic abnormalities. Given that the patient will be 35 years old at the time of delivery I reviewed that her risk (at delivery) of having a  with any chromosome abnormality is 1:127  and with trisomy 21 is 1: 224.    Invasive Testing    I offered invasive genetic testing (amniocentesis, chorionic villus sampling) after reviewing the diagnostic accuracy of these tests as well as the procedure associated loss rate (1:500 for genetic amniocentesis).    She ultimately does not desire invasive genetic testing.     Non-invasive Pregnancy Testing (NIPT)    I reviewed current non-invasive screening options.  Currently non-invasive pregnancy testing (NIPT) offers the highest detection rate (with the lowest  false positive rate) for the detection of fetal aneuploidy amongst high-risk patients.  The limitations of detailed mid-trimester sonography was reviewed with the patient.  First trimester screening and second trimester multiple-marker serum serum screening as alternative aneuploidy screening options were also reviewed.  However, both of these tests are associated with lower detection and higher false positive rates.    The patient has already obtained a low-risk  NPIT result and was appropriately reassured.   \  IMPRESSION:  IUP at 20w4d  AMA: low-risk cell free fetal DNA, declined invasive testing    RECOMMENDATIONS:  Continue care with Dr. Sweeney  Follow-up growth & BPP ultrasound at 32 weeks.  Weekly NST's at 36 weeks.    Thank you for allowing me to participate in the care of your patient.  Please do not hesitate to contact me if additional questions or concerns arise.      Bro Negro M.D.    40 minutes spent in review of records, patient consultation, documentation and coordination of care.  The relevant clinical matter(s) are summarized above.     Note to patient and family  The 21st Century Cures Act makes medical notes available to patients in the interest of transparency.  However, please be advised that this is a medical document.  It is intended as kbiq-xc-upky communication.  It is written and medical language may contain abbreviations or verbiage that are technical and unfamiliar.  It may appear blunt or direct.  Medical documents are intended to carry relevant information, facts as evident, and the clinical opinion of the practitioner.

## 2025-07-25 ENCOUNTER — OFFICE VISIT (OUTPATIENT)
Dept: PERINATAL CARE | Facility: HOSPITAL | Age: 35
End: 2025-07-25
Attending: OBSTETRICS & GYNECOLOGY
Payer: COMMERCIAL

## 2025-07-25 ENCOUNTER — ROUTINE PRENATAL (OUTPATIENT)
Dept: OBGYN CLINIC | Facility: CLINIC | Age: 35
End: 2025-07-25
Payer: COMMERCIAL

## 2025-07-25 VITALS
BODY MASS INDEX: 25 KG/M2 | HEART RATE: 77 BPM | WEIGHT: 153 LBS | SYSTOLIC BLOOD PRESSURE: 120 MMHG | DIASTOLIC BLOOD PRESSURE: 62 MMHG

## 2025-07-25 VITALS
BODY MASS INDEX: 25.49 KG/M2 | DIASTOLIC BLOOD PRESSURE: 67 MMHG | WEIGHT: 153 LBS | HEART RATE: 82 BPM | SYSTOLIC BLOOD PRESSURE: 102 MMHG | HEIGHT: 65 IN

## 2025-07-25 DIAGNOSIS — Z67.91 RH NEGATIVE STATE IN ANTEPARTUM PERIOD (HCC): ICD-10-CM

## 2025-07-25 DIAGNOSIS — O09.529 ANTEPARTUM MULTIGRAVIDA OF ADVANCED MATERNAL AGE (HCC): ICD-10-CM

## 2025-07-25 DIAGNOSIS — Z34.80 PRENATAL CARE, SUBSEQUENT PREGNANCY, ANTEPARTUM (HCC): Primary | ICD-10-CM

## 2025-07-25 DIAGNOSIS — Z36.9 ANTENATAL SCREENING ENCOUNTER (HCC): ICD-10-CM

## 2025-07-25 DIAGNOSIS — O26.899 RH NEGATIVE STATE IN ANTEPARTUM PERIOD (HCC): ICD-10-CM

## 2025-07-25 DIAGNOSIS — O09.529 ANTEPARTUM MULTIGRAVIDA OF ADVANCED MATERNAL AGE (HCC): Primary | ICD-10-CM

## 2025-07-25 PROCEDURE — 76811 OB US DETAILED SNGL FETUS: CPT | Performed by: OBSTETRICS & GYNECOLOGY

## 2025-07-25 NOTE — PROGRESS NOTES
Chief Complaint   Patient presents with    Prenatal Care     MAITE     Routine prenatal visit. Patient without complaints. Good fetal movement.  Patient denies any bleeding, leaking fluid, cramping, or contractions.    Assessment/Plan:  20w4d doing well  Ab scree, 1 hr GTT and CBC next.  Blood type A neg  AMA- Level 2 done.  Patient had NIPT.     Diagnoses and all orders for this visit:    Prenatal care, subsequent pregnancy, antepartum ([facility])     screening encounter ([facility])  -     Cancel: CBC (with DIFF, Platelet) Reflex to Ferritin; Future  -     Cancel: Glucose 1 HR OB  -     Cancel: ANTIBODY SCREEN; Future  -     ANTIBODY SCREEN; Future  -     CBC (with DIFF, Platelet) Reflex to Ferritin; Future  -     Glucose 1 HR OB    Rh negative state in antepartum period ([facility])  -     Cancel: ANTIBODY SCREEN; Future  -     ANTIBODY SCREEN; Future      Return in about 4 weeks (around 2025) for Routine Prenatal Visit, Glucola and labs.

## 2025-08-18 ENCOUNTER — ROUTINE PRENATAL (OUTPATIENT)
Dept: OBGYN CLINIC | Facility: CLINIC | Age: 35
End: 2025-08-18

## 2025-08-18 VITALS
SYSTOLIC BLOOD PRESSURE: 102 MMHG | DIASTOLIC BLOOD PRESSURE: 60 MMHG | BODY MASS INDEX: 25.72 KG/M2 | HEART RATE: 77 BPM | WEIGHT: 154.38 LBS | HEIGHT: 65 IN

## 2025-08-18 DIAGNOSIS — Z34.80 PRENATAL CARE, SUBSEQUENT PREGNANCY, ANTEPARTUM (HCC): Primary | ICD-10-CM

## 2025-08-22 ENCOUNTER — TELEPHONE (OUTPATIENT)
Dept: OBGYN CLINIC | Facility: CLINIC | Age: 35
End: 2025-08-22

## 2025-08-23 LAB — GLUCOSE, GESTATIONAL SCREEN (50G)-130 CUTOFF: 106 MG/DL

## (undated) DEVICE — SYSTEM COLL W/ TISS TRAP INCLUDE COLL CANSTR

## (undated) DEVICE — PACK GYNE CUSTOM

## (undated) DEVICE — SUT CHRM GUT 3-0 27IN SH ABSRB UD 26MM 1/2

## (undated) DEVICE — SOLUTION IRRIG 1000ML 0.9% NACL USP BTL

## (undated) DEVICE — SLEEVE COMPR MD KNEE LEN SGL USE KENDALL SCD

## (undated) DEVICE — SET COLL TBNG 3/8INX6FT W/ INTEGR SWVL

## (undated) DEVICE — HOSE CONN 18IN SER FOR BERK SAFETOUCH COLL

## (undated) DEVICE — PREMIUM WET SKIN PREP TRAY: Brand: MEDLINE INDUSTRIES, INC.

## (undated) DEVICE — Device

## (undated) DEVICE — GLOVE SUR 9 SENSICARE PI PIP CRM PWD F

## (undated) NOTE — MR AVS SNAPSHOT
[address], [place] [address] [address] [phone]               Thank you for choosing us for your health care visit with Rama Mckenzie MD.  We are glad to serve you and happy to provide you with this sum Where to Get Your Medications      These medications were sent to [address] [place], [place] [address] [phone], [address]     Phone:  [phone]    - Levonorgestrel-Ethinyl Estrad 0.1-20 MG-MCG Tabs

## (undated) NOTE — MR AVS SNAPSHOT
After Visit Summary   1/20/2017    Lindsay Moreno    MRN: [id number]           Visit Information        Provider Department Dept Phone    1/20/2017 10:00 AM Ro Fagan MD [facility] [phone]      Your Vitals Were     BP Pulse Ht W drinks, candies and desserts   Eat plenty of low-fat dairy products High fat meats and dairy   Choose whole grain products Foods high in sodium   Water is best for hydration Fast food.    Eat at home when possible     Tips for increasing your physical activ

## (undated) NOTE — LETTER
Jaquan Jerry, :10/5/1990    CONSENT FOR PROCEDURE/SEDATION    1. I authorize the performance upon Rosalia Barron  the following: Colposcopy with biopsy and Endocervical curettage    2.  I authorize Dr. Gertrude Severance, MD (and whomever is designat Relationship to patient: ____________________________________________    Witness: _________________________________________ Date:___________     Physician Signature: _______________________________ Date:___________

## (undated) NOTE — LETTER
[address]  [address]  Authorization for Surgical Operation and Procedure     Date:___________                                                                                                         Time:__________  I hereby authorize Surgeon(s):  Nicholas Latif MD, my physician and his/her assistants (if applicable), which may include medical students, residents, and/or fellows, to perform the following surgical operation/ procedure and administer such anesthesia as may be determined necessary by my physician:  Operation/Procedure name (s) Procedure(s):  DILATION AND CURETTAGE SUCTION on Shannan Joel   2.   I recognize that during the surgical operation/procedure, unforeseen conditions may necessitate additional or different procedures than those listed above.  I, therefore, further authorize and request that the above-named surgeon, assistants, or designees perform such procedures as are, in their judgment, necessary and desirable.    3.   My surgeon/physician has discussed prior to my surgery the potential benefits, risks and side effects of this procedure; the likelihood of achieving goals; and potential problems that might occur during recuperation.  They also discussed reasonable alternatives to the procedure, including risks, benefits, and side effects related to the alternatives and risks related to not receiving this procedure.  I have had all my questions answered and I acknowledge that no guarantee has been made as to the result that may be obtained.    4.   Should the need arise during my operation/procedure, which includes change of level of care prior to discharge, I also consent to the administration of blood and/or blood products.  Further, I understand that despite careful testing and screening of blood or blood products by collecting agencies, I may still be subject to ill effects as a result of receiving a blood transfusion and/or blood products.  The  following are some, but not all, of the potential risks that can occur: fever and allergic reactions, hemolytic reactions, transmission of diseases such as Hepatitis, AIDS and Cytomegalovirus (CMV) and fluid overload.  In the event that I wish to have an autologous transfusion of my own blood, or a directed donor transfusion, I will discuss this with my physician.  Check only if Refusing Blood or Blood Products  I understand refusal of blood or blood products as deemed necessary by my physician may have serious consequences to my condition to include possible death. I hereby assume responsibility for my refusal and release the hospital, its personnel, and my physicians from any responsibility for the consequences of my refusal.          o  Refuse      5.   I authorize the use of any specimen, organs, tissues, body parts or foreign objects that may be removed from my body during the operation/procedure for diagnosis, research or teaching purposes and their subsequent disposal by hospital authorities.  I also authorize the release of specimen test results and/or written reports to my treating physician on the hospital medical staff or other referring or consulting physicians involved in my care, at the discretion of the Pathologist or my treating physician.    6.   I consent to the photographing or videotaping of the operations or procedures to be performed, including appropriate portions of my body for medical, scientific, or educational purposes, provided my identity is not revealed by the pictures or by descriptive texts accompanying them.  If the procedure has been photographed/videotaped, the surgeon will obtain the original picture, image, videotape or CD.  The hospital will not be responsible for storage, release or maintenance of the picture, image, tape or CD.    7.   I consent to the presence of a  or observers in the operating room as deemed necessary by my physician or their designees.     8.   I recognize that in the event my procedure results in extended X-Ray/fluoroscopy time, I may develop a skin reaction.    9. If I have a Do Not Attempt Resuscitation (DNAR) order in place, that status will be suspended while in the operating room, procedural suite, and during the recovery period unless otherwise explicitly stated by me (or a person authorized to consent on my behalf). The surgeon or my attending physician will determine when the applicable recovery period ends for purposes of reinstating the DNAR order.  10. Patients having a sterilization procedure: I understand that if the procedure is successful the results will be permanent and it will therefore be impossible for me to inseminate, conceive, or bear children.  I also understand that the procedure is intended to result in sterility, although the result has not been guaranteed.   11. I acknowledge that my physician has explained sedation/analgesia administration to me including the risk and benefits I consent to the administration of sedation/analgesia as may be necessary or desirable in the judgment of my physician.    I CERTIFY THAT I HAVE READ AND FULLY UNDERSTAND THE ABOVE CONSENT TO OPERATION and/or OTHER PROCEDURE.    _________________________________________  __________________________________  Signature of Patient     Signature of Responsible Person         ___________________________________         Printed Name of Responsible Person           _________________________________                 Relationship to Patient  _________________________________________  ______________________________  Signature of Witness          Date  Time      Patient Name: Shannan Ryan Wisam     : 10/5/1990                 Printed: 2025     Medical Record #: [id number]                     Page 1 of [address] [place] [address] [place]  [address]    Consent for Anesthesia    I, Shannan Ryan  Joel agree to be cared for by an anesthesiologist, who is specially trained to monitor me and give me medicine to put me to sleep or keep me comfortable during my procedure    I understand that my anesthesiologist is not an employee or agent of [facility] or [employer]. He or she works for [employer].    As the patient asking for anesthesia services, I agree to:  Allow the anesthesiologist (anesthesia doctor) to give me medicine and do additional procedures as necessary. Some examples are: Starting or using an “IV” to give me medicine, fluids or blood during my procedure, and having a breathing tube placed to help me breathe when I’m asleep (intubation). In the event that my heart stops working properly, I understand that my anesthesiologist will make every effort to sustain my life, unless otherwise directed by [facility] Do Not Resuscitate documents.  Tell my anesthesia doctor before my procedure:  If I am pregnant.  The last time that I ate or drank.  All of the medicines I take (including prescriptions, herbal supplements, and pills I can buy without a prescription (including street drugs/illegal medications). Failure to inform my anesthesiologist about these medicines may increase my risk of anesthetic complications.  If I am allergic to anything or have had a reaction to anesthesia before.  I understand how the anesthesia medicine will help me (benefits).  I understand that with any type of anesthesia medicine there are risks:  The most common risks are: nausea, vomiting, sore throat, muscle soreness, damage to my eyes, mouth, or teeth (from breathing tube placement).  Rare risks include: remembering what happened during my procedure, allergic reactions to medications, injury to my airway, heart, lungs, vision, nerves, or muscles and in extremely rare instances death.  My doctor has explained to me other choices available to me for my care (alternatives).  Pregnant  Patients (“epidural”):  I understand that the risks of having an epidural (medicine given into my back to help control pain during labor), include itching, low blood pressure, difficulty urinating, headache or slowing of the baby’s heart. Very rare risks include infection, bleeding, seizure, irregular heart rhythms and nerve injury.  Regional Anesthesia (“spinal”, “epidural”, & “nerve blocks”):  I understand that rare but potential complications include headache, bleeding, infection, seizure, irregular heart rhythms, and nerve injury.    I can change my mind about having anesthesia services at any time before I get the medicine.    _____________________________________________________________________________  Patient (or Representative) Signature/Relationship to Patient  Date   Time    _____________________________________________________________________________   Name (if used)    Language/Organization   Time    _____________________________________________________________________________  Anesthesiologist Signature     Date   Time  I have discussed the procedure and information above with the patient (or patient’s representative) and answered their questions. The patient or their representative has agreed to have anesthesia services.    _____________________________________________________________________________  Witness        Date   Time  I have verified that the signature is that of the patient or patient’s representative, and that it was signed before the procedure  Patient Name: Shannan Ryan Joel     : 10/5/1990                 Printed: 2025     Medical Record #: [id number]                     Page 2 of 2

## (undated) NOTE — LETTER
Patient Name: Austin Dunn  YOB: 1990          MRN number:  [id number]  Date:  2023  Referring Physician:  Portia Zhang      Dear Rosie Haquejaguar  Pt has attended 5 visits in Physical Therapy. Diagnosis:    (normal spontaneous vaginal delivery) (O80)    Referring Provider: Gissel Gaston  Date of Evaluation:    23    Precautions:  None Next MD visit:   none scheduled  Date of Surgery: n/a   Insurance Primary/Secondary: [employer] / N/A     # Auth Visits: 10           Subjective: Reports feeling good overall; feels ready to be done with PT today. Pain: 0/10      Objective:   Good TvA activation bilaterally  Good breath regulation with coordinated PFM contraction   Improving PFM endurance, fast twitch muscle recruitment, proprioceptive awareness      Assessment: At this time, patient has met majority of LTGs. She demonstrates improved postural awareness and PFM engagement ability in isolation, as well as in conjunction with functional movements. She endorses adherence to HEP and verbalizes intent to continue to perform PFM activation exercise, as well as to be more mindful of breath regulation for pressure management moving forward. She reports feeling ready to perform exercises independently at this time. Updated HEP and discussed ideal frequency of exercise. Dhruv Sethi will be discharged today. Goals: (to be met in 10 visits)  Patient will improve PERF score to at least 3+/8/8//10 for optimal pelvic floor function and pelvic organ support. Mostly met. Patient will report consistent grade 4 on BSS for improved bowel emptying without straining. Met. Patient will demonstrate ability to sustain a PFM contraction while lifting a 10lb object from the ground for safe return to household and occupational lifting. Met.   Patient will report use of KNACK contraction at least 75% of the time to re-establish cough reflex and mitigate urinary leakage with increases in intra-abdominal pressure. Met. Patient will report adherence to HEP for continued exercise benefits  following cessation of PT. Met. Sincerely,  Electronically signed by therapist: Lucia Malhotra PT     Physician's certification required:  No        21st Century Cures Act Notice to Patient: Medical documents like this are made available to patients in the interest of transparency. However, be advised this is a medical document and it is intended as [id number] communication between your medical providers. This medical document may contain abbreviations, assessments, medical data, and results or other terms that are unfamiliar. Medical documents are intended to carry relevant information, facts as evident, and the clinical opinion of the practitioner. As such, this medical document may be written in language that appears blunt or direct. You are encouraged to contact your medical provider and/or [facility] if you have any questions about this medical document.